# Patient Record
Sex: MALE | Race: WHITE | Employment: OTHER | URBAN - METROPOLITAN AREA
[De-identification: names, ages, dates, MRNs, and addresses within clinical notes are randomized per-mention and may not be internally consistent; named-entity substitution may affect disease eponyms.]

---

## 2019-12-19 ENCOUNTER — HOSPITAL ENCOUNTER (OUTPATIENT)
Dept: WOUND CARE | Age: 74
Discharge: HOME | End: 2019-12-19
Payer: MEDICARE

## 2019-12-19 ENCOUNTER — HOSPITAL ENCOUNTER (OUTPATIENT)
Dept: LAB | Age: 74
Discharge: HOME | End: 2019-12-19
Payer: MEDICARE

## 2019-12-19 ENCOUNTER — HOSPITAL ENCOUNTER (OUTPATIENT)
Dept: GENERAL RADIOLOGY | Age: 74
End: 2019-12-19
Attending: PODIATRIST
Payer: MEDICARE

## 2019-12-19 ENCOUNTER — HOSPITAL ENCOUNTER (OUTPATIENT)
Dept: GENERAL RADIOLOGY | Age: 74
Discharge: HOME | End: 2019-12-19
Payer: MEDICARE

## 2019-12-19 VITALS
OXYGEN SATURATION: 98 % | SYSTOLIC BLOOD PRESSURE: 130 MMHG | HEART RATE: 68 BPM | DIASTOLIC BLOOD PRESSURE: 76 MMHG | TEMPERATURE: 96.9 F

## 2019-12-19 DIAGNOSIS — L97.519 DIABETIC ULCER OF RIGHT FOOT (HCC): ICD-10-CM

## 2019-12-19 DIAGNOSIS — E11.621 DIABETIC ULCER OF RIGHT FOOT (HCC): ICD-10-CM

## 2019-12-19 PROBLEM — E11.42 DIABETIC POLYNEUROPATHY (HCC): Status: ACTIVE | Noted: 2019-12-19

## 2019-12-19 PROBLEM — L97.512 RIGHT FOOT ULCER, WITH FAT LAYER EXPOSED (HCC): Status: ACTIVE | Noted: 2019-12-19

## 2019-12-19 PROBLEM — L03.115 CELLULITIS OF RIGHT FOOT: Status: ACTIVE | Noted: 2019-12-19

## 2019-12-19 LAB
BASOPHILS # BLD: 0 K/UL (ref 0–0.1)
BASOPHILS NFR BLD: 0 % (ref 0–2)
DIFFERENTIAL METHOD BLD: ABNORMAL
EOSINOPHIL # BLD: 0.2 K/UL (ref 0–0.4)
EOSINOPHIL NFR BLD: 2 % (ref 0–5)
ERYTHROCYTE [DISTWIDTH] IN BLOOD BY AUTOMATED COUNT: 16 % (ref 11.6–14.5)
ERYTHROCYTE [SEDIMENTATION RATE] IN BLOOD: 71 MM/HR (ref 0–20)
HCT VFR BLD AUTO: 32 % (ref 36–48)
HGB BLD-MCNC: 9 G/DL (ref 13–16)
LYMPHOCYTES # BLD: 1.5 K/UL (ref 0.9–3.6)
LYMPHOCYTES NFR BLD: 16 % (ref 21–52)
MCH RBC QN AUTO: 24.3 PG (ref 24–34)
MCHC RBC AUTO-ENTMCNC: 28.1 G/DL (ref 31–37)
MCV RBC AUTO: 86.3 FL (ref 74–97)
MONOCYTES # BLD: 0.9 K/UL (ref 0.05–1.2)
MONOCYTES NFR BLD: 10 % (ref 3–10)
NEUTS SEG # BLD: 6.8 K/UL (ref 1.8–8)
NEUTS SEG NFR BLD: 72 % (ref 40–73)
PLATELET # BLD AUTO: 218 K/UL (ref 135–420)
PMV BLD AUTO: 9.9 FL (ref 9.2–11.8)
RBC # BLD AUTO: 3.71 M/UL (ref 4.7–5.5)
WBC # BLD AUTO: 9.4 K/UL (ref 4.6–13.2)

## 2019-12-19 PROCEDURE — 87070 CULTURE OTHR SPECIMN AEROBIC: CPT

## 2019-12-19 PROCEDURE — 85025 COMPLETE CBC W/AUTO DIFF WBC: CPT

## 2019-12-19 PROCEDURE — 73630 X-RAY EXAM OF FOOT: CPT

## 2019-12-19 PROCEDURE — 99213 OFFICE O/P EST LOW 20 MIN: CPT

## 2019-12-19 PROCEDURE — 97597 DBRDMT OPN WND 1ST 20 CM/<: CPT

## 2019-12-19 PROCEDURE — 36415 COLL VENOUS BLD VENIPUNCTURE: CPT

## 2019-12-19 PROCEDURE — 87077 CULTURE AEROBIC IDENTIFY: CPT

## 2019-12-19 PROCEDURE — 85652 RBC SED RATE AUTOMATED: CPT

## 2019-12-19 PROCEDURE — 87186 SC STD MICRODIL/AGAR DIL: CPT

## 2019-12-19 RX ORDER — MORPHINE SULFATE 15 MG/1
15 TABLET ORAL
COMMUNITY

## 2019-12-19 RX ORDER — BUDESONIDE AND FORMOTEROL FUMARATE DIHYDRATE 160; 4.5 UG/1; UG/1
2 AEROSOL RESPIRATORY (INHALATION) 2 TIMES DAILY
COMMUNITY

## 2019-12-19 RX ORDER — CARVEDILOL 25 MG/1
25 TABLET ORAL 2 TIMES DAILY WITH MEALS
Status: ON HOLD | COMMUNITY
End: 2020-06-30

## 2019-12-19 RX ORDER — ALLOPURINOL 300 MG/1
300 TABLET ORAL DAILY
COMMUNITY

## 2019-12-19 RX ORDER — FLUTICASONE PROPIONATE 50 MCG
2 SPRAY, SUSPENSION (ML) NASAL DAILY
COMMUNITY

## 2019-12-19 RX ORDER — OMEPRAZOLE 20 MG/1
20 CAPSULE, DELAYED RELEASE ORAL DAILY
Status: ON HOLD | COMMUNITY
End: 2020-06-30

## 2019-12-19 RX ORDER — POLYETHYLENE GLYCOL 3350 17 G/17G
17 POWDER, FOR SOLUTION ORAL DAILY
COMMUNITY

## 2019-12-19 RX ORDER — DOXYCYCLINE 100 MG/1
100 CAPSULE ORAL 2 TIMES DAILY
Qty: 14 CAP | Refills: 0 | Status: SHIPPED | OUTPATIENT
Start: 2019-12-19 | End: 2020-01-02 | Stop reason: SINTOL

## 2019-12-19 RX ORDER — TRAMADOL HYDROCHLORIDE 50 MG/1
50 TABLET ORAL
COMMUNITY

## 2019-12-19 RX ORDER — TORSEMIDE 20 MG/1
20 TABLET ORAL DAILY
COMMUNITY
End: 2020-10-05

## 2019-12-19 RX ORDER — INSULIN LISPRO 100 [IU]/ML
1-10 INJECTION, SOLUTION INTRAVENOUS; SUBCUTANEOUS AS NEEDED
COMMUNITY

## 2019-12-19 NOTE — DISCHARGE INSTRUCTIONS
Discharge Instructions for  [facility]  [address] [facility]  Telephone: [phone]    NAME:  Harry Shrestha OF BIRTH:  1945  MEDICAL RECORD NUMBER:  [id number]  EPISODE DATE:  12/19/2019        Mr. Manoj Webb, Dr Naveen Dawson recommends the following discharge instruction:    Offloading    [x]   Felt/Foam Offloading   [] Removable Cast Waker       []   Wedge Shoe   []  Wheelchair     []   Total Contact Cast   []  Surgical Shoe     []   Crutches        Other- Prevelon boot   Mattress:   Other:     Edema Control    []   Elevated legs as much as possible. Recommend above level of heart.     []   Layered Wraps             []   Left      []   Right      []  BILATERAL          - Type:            []   Unna Boot       []  Multi-Layer                                   []   Two Layers     []  Three Layers   []  Four Layers     []   Tubular Bandages        []   Left      []   Right     SIZE:      []   Stockings                      []   Left      []   Right     []   Compression Pump     []   Left      []   Right    ___ millimeters of mercury  ___ millimeters of mercury for ___ minutes for ___ day(s)        Other:       Nutritional Supplements    []  Multi-Vitamin       []  Other:       Select One     [x]  Home Health: family to do dressing change     []  Long Term Care:      []  DME:     Consult    []   Nutrition     []   Vascular     []   Orthotist/Pedorthotist     []   Infectious Disease     []   Lymphedema Therapist   Other:     Dressings:  Another brand of generically equivalent product may be dispensed unless specifically ordered otherwise.     Home Ulcer/wound Hygiene (cleanse with)      []   Distilled Water     []   Normal Saline     [x]   Wound Cleanser     []   Mild antimicrobial soap and water     []   Chlorhexidine liquid soap and water     []   Other:     Apply    []   Hydrogel   []  Hypergel   [x]   Aquacel Ag     []   Cadexomer Iodine (Iodosorb)   []  Silver Alginate    []   Medihoney:     []   Collagenase:Santyl   []  Calcium Alginate   []   Collagen:     []   Plain Foam   []  Non-Adherent Contact                Layer   []   Xeroform     []   Silver foam   []   Hydrocolloid   Adriana to wound base     []   Acticoat   []   Adaptic:      Other/Specific Instructions:    Cover With    []   Dry Gauze and Roll Gauze     []   Foam and Roll Gauze     []   Dry Gauze     [x]   Bordered gauze: heel protector bordered foam     []   Foam      []    Bordered         []   Nonbordered     []   Secure with Tape     []   Other       Mariola-Ulcer Care    []   Cream     []   Lotion     []   Ointment     []   Barrier     []   Other:     Change Dressing    []   Once Daily     [x]   Every Other Day     []   Every 3 Days       []   Every 5 Days     []   Every 7 Days     []   Do Not Change       []   2 x per week (Mon and Thurs. )     []   3 x per week (Mon, Wed, Fri. )     []   Other          Follow-Up:   [x]  Follow up  1 month                            []  As Needed (PRN)     []   Marco Ennis MD    []  Claire Casey DPM    [x]  Bruce Tee DPM   []   Mimi Vidal DPM      []   Nurse Visit            Please call the wound clinic ([phone]) regarding any questions or concerns. Electronically signed Eduardo Leigh RN, CWOCN on 12/19/2019 at 10:37 AM     Serina Smith 281: Should you experience any significant changes in your wound(s) or have questions about your wound care, please contact the [facility] Main at [address] [place] [address] 8:00 am - 4:30. If you need help with your wound outside these hours and cannot wait until we are again available, contact your PCP or go to the hospital emergency room. PLEASE NOTE: IF YOU ARE UNABLE TO OBTAIN WOUND SUPPLIES, CONTINUE TO USE THE SUPPLIES YOU HAVE AVAILABLE UNTIL YOU ARE ABLE TO REACH US. IT IS MOST IMPORTANT TO KEEP THE WOUND COVERED AT ALL TIMES.      Physician Signature:_______________________    Date: ___________ Time:  ____________

## 2019-12-19 NOTE — WOUND CARE
Wound/Ostomy Nurse Progress Note          Patient: Liu Alexander                                                                                      TBU:[birth year]    MRN: [id number]          Situation:new pt, Dr Renata Sanderson    Background:hx DM, neuropathy, COPD. Slow healing wound to plantar right foot below 5th toe    Recommendation: Dr Renata Sanderson ordered labs and xray. Pt to complete tests today at [facility]. 12/19/19 0854   Wound   Date First Assessed: 12/19/19     Dressing Status Removed   Dressing Type Adhesive wound dressing (Band-Aid)   Wound Length (cm) 1.3 cm   Wound Width (cm) 1.2 cm   Wound Depth (cm) 0.1 cm   Wound Surface Area (cm^2) 1.56 cm^2   Wound Volume (cm^3) 0.16 cm^3   Condition of Base Granulation;Pink   Condition of Edges Calloused   Tissue Type Percent Pink 100   Drainage Amount Scant   Drainage Color Serous   Wound Odor None   Mariola-wound Assessment Dry; Intact   Margins Attached edges   Cleansing and Cleansing Agents  Dermal wound cleanser   Dressing Changed Changed/New   Dressing Type Applied   (grey, aquacel ag, bordered mepilex)     Clinic Level of Care Assessment    NAME:  Liu Alexander  YOB: 1945 GENDER: male  MEDICAL RECORD NUMBER:  [id number]   DATE:  12/19/2019      Wound Count Document in [facility]  Number of Wounds Assessed Points   No Wounds/Ulcers []   0   Less than Three Wounds/Ulcers [x]   1   3-6 Wounds/Ulcers []   2   Greater than 6 Wounds/Ulcers []   3     Ambulation Status Document in Coord/FERNANDO/Mobility tab  Status Definition Points   Independent Independently able to ambulate. Fully able (without any assistance) to get on/off exam table/chair. [x]   0   Minimal Physical Assistance Requires physical assistance of one person to ambulate and/or position patient to be examined. Includes necessary physical assistance to position lower extremities on/off stool.  []   1   Moderate Physical Assistance Requires at least one staff member to physically assist patient in ambulating into treatment room, and on/off exam table. []   2   Full Assistance Requires assistance of at least two staff members to transfer patient into treatment room and/or on/off exam table/chair. \"Total Transfer\". []   3     Dressing Complexity Document in LDA and Write Appropriate Order  Complexity Definition Points   No Dressing  []   0   Simple Minimal, simple dressing. i.e. Band-aid, gauze, simple wrap. []   1   Intermediate Moderately complicated requiring licensed personnel to apply i.e. collagen matrix, ointments, gels, alginates. [x]   2   Complex Complicated requiring licensed personnel to apply dressings 6 or more wounds. []   3     Teaching Effort Document in Education Tab   Effort Definition Points   No Teaching  []   0   Simple Reinforce two or less topics. Document in Education navigator.  []   1   Intermediate Reinforce three to five topics and/or one additional   new topic. Document in Education navigator. [x]   2   Complex Teach more than one new topic. New patient information   packet reviewed and/or reinforce more than three topics. Document in Education navigator. HBO initial instruction. []   3       Patient Assessment and Planning  Planning Definition Points   Simple Multiple System Simple: Simple follow-up with routine assessment and planning. If Discharged, instructions and long term/follow-up care given to patient/caregiver. Discharged, instructions and/or After Visit Summary given to patient/caregiver and instructions completed. []   1   Intermediate Multiple System Intermediate: Contact with outside resources; i.e. Telephone calls to home health, [facility].  May include filling out forms and writing letters, arranging transportation, communication with insurance , vendors, etc.  Discharged, instructions and/or After Visit Summary given to patient/caregiver and instructions completed. [x]   2   Complex Multiple System Complex: Full, comprehensive assessment and planning. Follow the entire navigator under Wound Visit charting filling out each tab which includes OP Adm Database Screening, Education and CarePlan  HBO risk assessment completed. Discharged, instructions and/or After Visit Summary given to patient/caregiver and instructions completed.    []   3           Is this the Patient's First Visit to the [address] [place] [address] Road  Yes      Is this Patient Established @ [facility]  No             Clinical Level of Care      Points  0-2  Level 1 []     Points  3-5  Level 2 []     Points  6-9  Level 3 [x]     Points  10-12  Level 4 []     Points  13-15  Level 5 []       Electronically signed by Hay Johnson RN on 12/19/2019 at 9:45 AM

## 2019-12-19 NOTE — PROGRESS NOTES
Podiatry Consultation Note    Assessment:       Patient Active Problem List   Diagnosis Code    Right foot ulcer, with fat layer exposed ([facility] [address] L97.512    Diabetic polyneuropathy ([facility] [address] E11.42    Cellulitis of right foot L03.115       Plan:     Selective excisional debridement with continued LWC with Adriana and Aquacel AG Q 48 hrs  He is to use surgical shoe to offload plantar right foot. After saline flush C&S + gram stain was taken sub met 5 right foot. Will order right foot series, Sed rate, CBC c Diff. Will empirically place patient on eRx Doxycycline 100mg BID x 7 days pending results from C&S. RTC in 2 weeks    Subjective:     I was asked to evaluate [facility] for chronic diabetic ulcer right foot. Patient has recently moved to [place] from [place] where he was receiving ulcer care and diabetic care from a Podiatrist. Wife states the ulcer has been present since the late summer. He  is a 76 y.o. male admitted on 12/19/2019 for wound. Allergies   Allergen Reactions    Bee Sting [Sting, Bee] Anaphylaxis    Contrast Agent [Iodine] Other (comments)       Current Outpatient Medications   Medication Sig    insulin lispro protamine/insulin lispro (HUMALOG MIX 75-25,U-100,INSULN) 100 unit/mL (75-25) injection 30 Units by SubCUTAneous route Before breakfast and dinner.  insulin lispro (HUMALOG U-100 INSULIN) 100 unit/mL injection 1-10 Units by SubCUTAneous route as needed (sliding scale).  morphine IR (MS IR) 15 mg tablet Take 15 mg by mouth every four (4) hours as needed for Pain.  allopurinol (ZYLOPRIM) 300 mg tablet Take 300 mg by mouth daily.  torsemide (DEMADEX) 20 mg tablet Take 20 mg by mouth daily.  polyethylene glycol (MIRALAX) 17 gram/dose powder Take 17 g by mouth daily.  fluticasone propionate (FLONASE ALLERGY RELIEF) 50 mcg/actuation nasal spray 2 Sprays by Both Nostrils route daily.     budesonide-formoterol (SYMBICORT) 160-4.5 mcg/actuation HFAA Take 2 Puffs by inhalation two (2) times a day.  carvedilol (COREG) 25 mg tablet Take 25 mg by mouth two (2) times daily (with meals).  traMADol (ULTRAM) 50 mg tablet Take 50 mg by mouth every six (6) hours as needed for Pain.  omeprazole (PRILOSEC) 20 mg capsule Take 20 mg by mouth daily.  rivaroxaban (XARELTO) 10 mg tablet Take 10 mg by mouth daily.  doxycycline (VIBRAMYCIN) 100 mg capsule Take 1 Cap by mouth two (2) times a day. Indications: an infection of the skin and the tissue below the skin     No current facility-administered medications for this encounter. No past medical history on file. No past surgical history on file. No family history on file.   Social History     Socioeconomic History    Marital status:      Spouse name: Not on file    Number of children: Not on file    Years of education: Not on file    Highest education level: Not on file   Occupational History    Not on file   Social Needs    Financial resource strain: Not on file    Food insecurity:     Worry: Not on file     Inability: Not on file    Transportation needs:     Medical: Not on file     Non-medical: Not on file   Tobacco Use    Smoking status: Not on file   Substance and Sexual Activity    Alcohol use: Not on file    Drug use: Not on file    Sexual activity: Not on file   Lifestyle    Physical activity:     Days per week: Not on file     Minutes per session: Not on file    Stress: Not on file   Relationships    Social connections:     Talks on phone: Not on file     Gets together: Not on file     Attends [employer]ism service: Not on file     Active member of club or organization: Not on file     Attends meetings of clubs or organizations: Not on file     Relationship status: Not on file    Intimate partner violence:     Fear of current or ex partner: Not on file     Emotionally abused: Not on file     Physically abused: Not on file     Forced sexual activity: Not on file   Other Topics Concern    Not on file   Social History Narrative    Not on file       Physical Exam:  GENERAL: alert, cooperative, no distress, appears stated age    REVIEW OF SYSTEMS:  General: denies chronic fatigue, weight loss, fever, anemia, bruising, depression, nervousness, panic attacks  HEENT: denies ringing in ears, ear infections, dizzy spells, poor vision, glaucoma, sinus trouble, hoarseness, eye infections  GI: denies diarrhea, gas, bloating, heartburn, regurgitation, difficulty swallowing, painful swallowing, nausea, vomiting, constipation, abdominal pain, decreased appetite, blood in stools, black stools, jaundice, dark urine  Lungs: denies pneumonia, asthma, cough, SOB, hemoptysis  Heart: denies chest pain, irregular heart beat, ankle swelling,   Skin: denies rashes, hives, allergic reaction  Urinary: denies UTI, kidney stones, decreased urine force and flow, urination at night, blood in urine, painful urination  Bones and Joints: denies arthritis, rheumatism, back pain, gout, osteoporosis  Neurologic: denies stroke, seizures, headaches, numbness, tingling      There were no vitals filed for this visit. OBJECTIVE:    Patient is well nourished, cooperative, pleasant and in no apparent distress. Lower Extremity Exam:     VASCULAR EXAM:. Pedal pulses are palpable 1/4 DP 1/4 PT right and left foot. Pulses heard with doppler are biphasic PT and monophasic DP Skin temperature is warm to warm right and left foot. Digital capillary fill time is 4 seconds right and left foot. There is mild edema of the right foot and ankle. NEUROLOGICAL EXAM:. Sensation Decrease with 5.07g monofilament wire right and left foot. Deep tendon reflexes intact and symmetrical on the right and left foot. Babinski is age appropriate, bilateral    MUSCULOSKELETAL EXAM:. Muscle tone is normal.  Muscle strength of the flexor and extensor group inversion and eversion Bilateral. 5/5.      MYCOTIC NAIL Dystrophic nail 1,2,3,4,5 bilateral. Elongated thickened nails 1,2,3,4,5 bilateral Hypertrophic nails 1,2,3,4,5    DERMATOLOGICAL EXAM:. Skin is of abnormal texture and turgor with atrophic skin changes noting hair growth, nail changes (thickening), Bilateral.There is noted erythema and edema over the lateral aspect of the 5th MPJ and distal shaft 5th metatarsal.  There is a ulcer full thickness sub met 5 right with serous exudate and mild odor noted upon debridement      No results found for this or any previous visit (from the past 24 hour(s)). Imaging: ordered today        EXCISIONAL DEBRIDEMENT NOTE    Selective sharp instrument debridement of slough and devitilized tissue    After the benefits/risks/SE were discussed, the patient agreed to proceed. Time out was done:   * Patient was identified by name and date of birth   * Agreement on procedure being performed was verified   * Procedure site verified and marked as necessary   * Patient was positioned for comfort   * Consent was signed and verified. Site: sub met 5 right    Instruments used:    []  Dermal curette  [x] Blade        [] #15  [x] #10  [] Forceps  [] Tissue nippers  [] sterile scissors  [] Other     Anesthesia:    []  EMLA 2.5% cream: applied to wound beds for approximately 15minutes. []   Lidocaine 2% Topical Gel      []  Lidocaine injectable 1% with epinephrine 1:100,000    []  Lidocaine injectable 1% without epinephrine    []  Other:     []  None         [] patient is insensate due to neuropathy         [] patient declines        [] allergy to anesthetic        [] tissue for debridement is either superficial, loosely adherent and/or necrotic and denervated     After satisfactory anesthesia achieved, the wound/s was/were sharply excisional debrided necrotic, devitalized and granulation tissue down to the sub Q layer, revealing a clean and viable wound bed. Post debridement measurement was 1.5__x_1.5_x_0.1_cm . Bleeding: <5mL Resolved with light focal pressure.      Wounds were cleaned and irrigated with saline. Wound care applied:   []   Hydrogell   []  Hypergel   [x]   Hydrofiber/Aquacel  AG    []   Cadexomer Iodine (Iodosorb)   []  Silver Alginate    []   Medihoney:    []   Collagenase:Santyl   []  Calcium Alginate   []   Collagen:    []   Foam   []  Non-Adherent Contact Layer   []   Xeroform    []   Adaptec:   []   Hydrocolloid   []   Transparent Film    []   Promogran   [x]   Adriana   []   Promogran       []  Antibiotic ointment/cream  []   Wound VAC   []   Other (see below)     Other:     []   Dry Gauze and Roll Gauze    []   Foam and Roll Gauze    []   Dry Gauze    []    Bordered gauze:     []   Secure with Tape    [x]   Other mepilex and surgical shoe to offload sub met 5 right     [x]   Compression Wrap:          []   Unna Boot    []Multi-Layer    [x]Tubular Bandages       Mariola-Ulcer Care    []   Cream     []   Lotion     []   Ointment     []   Barrier     []   Other:       The patient tolerated the procedure well with no complications. The patient left the exam room in satisfactory and stable condition.        Ronny Garcia DPM  [facility]  [phone]  12/19/2019, 9:35 AM

## 2019-12-22 LAB
BACTERIA SPEC CULT: ABNORMAL
GRAM STN SPEC: ABNORMAL
GRAM STN SPEC: ABNORMAL
SERVICE CMNT-IMP: ABNORMAL

## 2020-01-02 ENCOUNTER — HOSPITAL ENCOUNTER (OUTPATIENT)
Dept: WOUND CARE | Age: 75
Discharge: HOME | End: 2020-01-02
Payer: MEDICARE

## 2020-01-02 VITALS
OXYGEN SATURATION: 94 % | HEART RATE: 67 BPM | SYSTOLIC BLOOD PRESSURE: 127 MMHG | DIASTOLIC BLOOD PRESSURE: 59 MMHG | TEMPERATURE: 96.3 F

## 2020-01-02 PROCEDURE — 99213 OFFICE O/P EST LOW 20 MIN: CPT

## 2020-01-02 NOTE — DISCHARGE INSTRUCTIONS
Discharge Instructions for  [address]  Telephone: [phone]    NAME:  Ismael Ocasio  YOB: 1945  MEDICAL RECORD NUMBER:  [id number]  EPISODE DATE:  1/2/2020        Laura JhaveriZhannamilly Askew, Dr. Rogelio Ward recommends the following discharge instruction:    Offloading    [x]   Felt/Foam Offloading   [] Removable Cast Luwanna Spatz       []   Wedge Shoe   []  Wheelchair     []   Total Contact Cast   []  Surgical Shoe     []   Crutches        Other   Mattress:   Other:     Edema Control    []   Elevated legs as much as possible. Recommend above level of heart.     []   Layered Wraps             []   Left      []   Right      []  BILATERAL          - Type:            []   Unna Boot       []  Multi-Layer                                   []   Two Layers     []  Three Layers   []  Four Layers     []   Tubular Bandages        []   Left      []   Right     SIZE:      []   Stockings                      []   Left      []   Right     []   Compression Pump     []   Left      []   Right    ___ millimeters of mercury  ___ millimeters of mercury for ___ minutes for ___ day(s)        Other:       Nutritional Supplements    []  Multi-Vitamin       []  Other:       Select One     []  Home Health:      []  Long Term Care:      []  DME:     Consult    []   Nutrition     []   Vascular     []   Orthotist/Pedorthotist     []   Infectious Disease     []   Lymphedema Therapist   Other:     Dressings:  Another brand of generically equivalent product may be dispensed unless specifically ordered otherwise.     Home Ulcer/wound Hygiene (cleanse with)      []   Distilled Water     []   Normal Saline     []   Wound Cleanser     []   Mild antimicrobial soap and water     []   Chlorhexidine liquid soap and water     []   Other:     Apply    []   Hydrogel   []  Hypergel   []   Aquacel Ag     []   Cadexomer Iodine                     (Iodosorb)   []  Silver Alginate []   Medihoney:     []   Collagenase:Santyl   []  Calcium Alginate   []   Collagen:     []   Plain Foam   []  Non-Adherent Contact                Layer   []   Xeroform     []   Silver foam   []   Hydrocolloid        []   Acticoat   []   Adaptic:      Other/Specific Instructions: Mirragen bio glass was applied today. Dr. Jm Dan will do a second application on 2/9/03. You DO NOT need to change this dressing. Cover With    []   Dry Gauze and Roll Gauze     []   Foam and Roll Gauze     []   Dry Gauze     []   Bordered gauze:     []   Foam      []    Bordered         []   Nonbordered     []   Secure with Tape     [x]   Other COBAN (may be applied over top of dressing to protect it)      Mariola-Ulcer Care    []   Cream     []   Lotion     []   Ointment     []   Barrier     []   Other:     Change Dressing    []   Once Daily     []   Every Other Day     []   Every 3 Days       []   Every 5 Days     []   Every 7 Days     []   Do Not Change       []   2 x per week (Mon and Thurs. )     []   3 x per week (Mon, Wed, Fri. )     []   Other          Follow-Up:   [x]  Follow up  1/9/20 and 1/16/20                            []  As Needed (PRN)     []   Luis Garcia MD    []  Kenneth Berkowitz DPM    [x]  Travis Ariza DPM   []   Ramona Ocampo DPM      []   Nurse Visit            Please call the wound clinic ([phone]) regarding any questions or concerns. Electronically signed Kaya Haas RN on 1/2/2020 at 11:41 AM     [address] [place] [address] Information: Should you experience any significant changes in your wound(s) or have questions about your wound care, please contact the [facility] Main at [address] [place] [address] 8:00 am - 4:30. If you need help with your wound outside these hours and cannot wait until we are again available, contact your PCP or go to the hospital emergency room.      PLEASE NOTE: IF YOU ARE UNABLE TO OBTAIN WOUND SUPPLIES, CONTINUE TO USE THE SUPPLIES YOU HAVE AVAILABLE UNTIL YOU ARE ABLE TO REACH US. IT IS MOST IMPORTANT TO KEEP THE WOUND COVERED AT ALL TIMES.      Physician Signature:_______________________    Date: ___________ Time:  ____________

## 2020-01-02 NOTE — PROGRESS NOTES
MIRRAGEN® Advanced Wound Matrix   and Debridement Procedure Note    Pre-Operative Diagnosis: Non-Healing Wound  Post-Operative Diagnosis: Same    Site: sub met 5 right     Procedure:   1. Selective sharp instrument debridement of slough and devitilized tissue  2. Application of MIRRAGEN® Advanced Wound Matrix    Indications: 1. Removal of devitalized and necrotic tissue to promote healing and evaluate the extent of healing. 2. Stage 1 of a planned staged series of    10         applications of MIRRAGEN® Advanced Wound Matrix in wound not responding to conventional therapy     After the benefits/risks/SE were discussed, the patient agreed to proceed. Time out was done:   * Patient was identified by name and date of birth   * Agreement on procedure being performed was verified   * Procedure site verified and marked as necessary   * Patient was positioned for comfort   * Consent was signed and verified. Instruments used:    []  Dermal curette  Blade        [] #15  [] #10  [x] Forceps  [] Tissue nippers  [] sterile scissors  [] Other     Anesthesia used:    []  EMLA 2.5% cream: applied to wound beds for approximately 15minutes. []   Lidocaine 2% Topical Gel      []  Lidocaine injectable 1% with epinephrine 1:100,000; Amount used: mL    []  Lidocaine injectable 1% without epinephrine; Amount used: mL    []  Other:     [x]  None         [] patient is insensate due to neuropathy         [] patient declines        [] allergy to anesthetic        [] tissue for debridement is either superficial, loosely adherent and/or necrotic and denervated     Description of Procedure: After usual preparation,     Pre-debridement measurement: see accompanying progress note  Post debridement measurement:  1.0_x_1.1_x_0.1_cm . Bleeding: <5mL Resolved with light focal pressure. Wounds were cleaned and irrigated with saline.      After usual preparation, Mirragen applied to the wound and affixed to the skin with steri strips and covered with non-adherent contact layer. The patient tolerated the procedure without complication. MIRRAGEN Advanced Wound Matrix 5 sq cm sheet  MIRRAGEN Advanced Wound Matrix used: 5 sq cm  MIRRAGEN Advanced Wound Matrix discarded: 0 sq cm    Wound care applied:   []   Hydrogell   []  Hypergel   [x]   Hydrofiber/Aquacel  AG    []   Cadexomer Iodine (Iodosorb)   []  Silver Alginate    []   Medihoney:    []   Collagenase:Santyl   []  Calcium Alginate   []   Collagen:    [x]   Foam offloading foam   []  Non-Adherent Contact Layer   []   Xeroform    []   Adaptec:   []   Hydrocolloid   []   Transparent Film    []  Antibiotic ointment/cream    []  hyderofera blue   []   Other (see below)    [] Mesalt       Other:     []   Dry Gauze and Roll Gauze    []   Foam and Roll Gauze    []   Dry Gauze    []    Bordered gauze:     []   Secure with Tape    [x]   Other  coban    []   Compression Wrap:          []   Unna Boot    []Multi-Layer    []Tubular Bandages       Mariola-Ulcer Care    []   Cream     []   Lotion     []   Ointment     []   Barrier     []   Other:     Sed rate from 12/19/19 = 71      EXAM: RIGHT FOOT RADIOGRAPHS     CLINICAL INDICATION/HISTORY: diabetic ulcer right  -Additional: None     COMPARISON: None     TECHNIQUE: 3 views of the right foot     _______________     FINDINGS:     BONES: Osseous alignment is as expected on the provided nonweightbearing  projections. Severe right first MTP joint osteoarthritis is present along with  advanced degenerative changes across the tarsometatarsal articulations, greatest  involving the third through fifth TMT joints with prominent subchondral cyst  formation present with in the anterior aspect of the cuboid at the level of the  fifth TMT joint. No evidence of fracture.     SOFT TISSUES: Relatively diffuse soft tissue swelling. No retained radiopaque  foreign object.  Heterotopic ossification inferior to the medial malleolus in  keeping with prior sprain.     _______________     IMPRESSION  IMPRESSION:        1. Degenerative changes as described as well as diffuse soft tissue swelling  without evidence of fracture or retained radiopaque foreign object. Imaging     The patient tolerated the procedure well with no complications. Will continue to monitor the possible need for MRI with the slightly elevated Sed Rate with no elevation in wbc nor clinical signs of infection. Don't change dressing. RTC in 1 week for second application   The patient left the exam room in satisfactory and stable condition.      Arthmoses Melissa DPM

## 2020-01-02 NOTE — WOUND CARE
01/02/20 0847   Wound   Date First Assessed: 12/19/19     Dressing Status Removed   Dressing Type Adhesive wound dressing (Band-Aid)   Wound Length (cm) 1 cm   Wound Width (cm) 1.1 cm   Wound Surface Area (cm^2) 1.1 cm^2   Condition of Base Pink   Tissue Type Percent Pink 100   Drainage Amount None   Wound Odor None   Mariola-wound Assessment Other (Comment)  (Calloused)   Cleansing and Cleansing Agents  Dermal wound cleanser   Dressing Changed Changed/New   Dressing Type Applied Other (Comment)  (Mirragen, Aquacel Ag, Off-loading foam)     Clinic Level of Care Assessment    NAME:  Shiv Roy  YOB: 1945 GENDER: male  MEDICAL RECORD NUMBER:  [id number]   DATE:  1/2/2020      Wound Count Document in [address] [place] [address]  Number of Wounds Assessed Points   No Wounds/Ulcers []   0   Less than Three Wounds/Ulcers [x]   1   3-6 Wounds/Ulcers []   2   Greater than 6 Wounds/Ulcers []   3     Ambulation Status Document in Coord/FERNANDO/Mobility tab  Status Definition Points   Independent Independently able to ambulate. Fully able (without any assistance) to get on/off exam table/chair. [x]   0   Minimal Physical Assistance Requires physical assistance of one person to ambulate and/or position patient to be examined. Includes necessary physical assistance to position lower extremities on/off stool. []   1   Moderate Physical Assistance Requires at least one staff member to physically assist patient in ambulating into treatment room, and on/off exam table. []   2   Full Assistance Requires assistance of at least two staff members to transfer patient into treatment room and/or on/off exam table/chair. \"Total Transfer\". []   3     Dressing Complexity Document in LDA and Write Appropriate Order  Complexity Definition Points   No Dressing  []   0   Simple Minimal, simple dressing. i.e. Band-aid, gauze, simple wrap.  []   1   Intermediate Moderately complicated requiring licensed personnel to apply i.e. collagen matrix, ointments, gels, alginates. [x]   2   Complex Complicated requiring licensed personnel to apply dressings 6 or more wounds. []   3     Teaching Effort Document in Education Tab   Effort Definition Points   No Teaching  []   0   Simple Reinforce two or less topics. Document in Education navigator. [x]   1   Intermediate Reinforce three to five topics and/or one additional   new topic. Document in Education navigator. []   2   Complex Teach more than one new topic. New patient information   packet reviewed and/or reinforce more than three topics. Document in Education navigator. HBO initial instruction. []   3       Patient Assessment and Planning  Planning Definition Points   Simple Multiple System Simple: Simple follow-up with routine assessment and planning. If Discharged, instructions and long term/follow-up care given to patient/caregiver. Discharged, instructions and/or After Visit Summary given to patient/caregiver and instructions completed. [x]   1   Intermediate Multiple System Intermediate: Contact with outside resources; i.e. Telephone calls to home health, [facility]. May include filling out forms and writing letters, arranging transportation, communication with insurance , vendors, etc.  Discharged, instructions and/or After Visit Summary given to patient/caregiver and instructions completed. []   2   Complex Multiple System Complex: Full, comprehensive assessment and planning. Follow the entire navigator under Wound Visit charting filling out each tab which includes OP Adm Database Screening, Education and CarePlan  HBO risk assessment completed. Discharged, instructions and/or After Visit Summary given to patient/caregiver and instructions completed.    []   3           Is this the Patient's First Visit to the [address]  No      Is this Patient Established @ [facility]  Yes             Clinical Level of Care      Points  0-2  Level 1 []     Points  3-5  Level 2 [x] Points  6-9  Level 3 []     Points  10-12  Level 4 []     Points  13-15  Level 5 []       Electronically signed by Gladys Giraldo RN on 1/2/2020 at 9:21 AM

## 2020-01-09 ENCOUNTER — HOSPITAL ENCOUNTER (OUTPATIENT)
Dept: WOUND CARE | Age: 75
Discharge: HOME | End: 2020-01-09
Payer: MEDICARE

## 2020-01-09 VITALS
DIASTOLIC BLOOD PRESSURE: 70 MMHG | HEART RATE: 75 BPM | SYSTOLIC BLOOD PRESSURE: 152 MMHG | TEMPERATURE: 97 F | OXYGEN SATURATION: 99 %

## 2020-01-09 RX ORDER — AMOXICILLIN AND CLAVULANATE POTASSIUM 500; 125 MG/1; MG/1
1 TABLET, FILM COATED ORAL 2 TIMES DAILY
Qty: 10 TAB | Refills: 0 | Status: SHIPPED | OUTPATIENT
Start: 2020-01-09 | End: 2020-03-05

## 2020-01-09 NOTE — PROGRESS NOTES
MIRRAGEN® Advanced Wound Matrix and Debridement Procedure Note    Pre-Operative Diagnosis: Non-Healing Wound  Post-Operative Diagnosis: Same    Site: sub met 5 right     Procedure:   1. Selective sharp instrument debridement of slough and devitilized tissue  2. Application of MIRRAGEN® Advanced Wound Matrix    Indications: 1. Removal of devitalized and necrotic tissue to promote healing and evaluate the extent of healing. 2. Stage 2 of a planned staged series of      10                    applications of MIRRAGEN® Advanced Wound Matrix in wound not responding to conventional therapy     After the benefits/risks/SE were discussed, the patient agreed to proceed. Time out was done:   * Patient was identified by name and date of birth   * Agreement on procedure being performed was verified   * Procedure site verified and marked as necessary   * Patient was positioned for comfort   * Consent was signed and verified. Instruments used:    []  Dermal curette  Blade        [] #15  [] #10  [] Forceps  [] Tissue nippers  [] sterile scissors  [] Other     Anesthesia used:    []  EMLA 2.5% cream: applied to wound beds for approximately 15minutes. []   Lidocaine 2% Topical Gel      []  Lidocaine injectable 1% with epinephrine 1:100,000; Amount used: mL    []  Lidocaine injectable 1% without epinephrine; Amount used: mL    []  Other:     []  None         [] patient is insensate due to neuropathy         [] patient declines        [] allergy to anesthetic        [] tissue for debridement is either superficial, loosely adherent and/or necrotic and denervated     Description of Procedure: After usual preparation, An selective excisional debridement with #10 blade and hemostasis was obtained with light pressure  Pre-debridement measurement: see accompanying progress note  Post debridement measurement:  2.0_x_1.1_x_0.1_cm . Bleeding: <5mL Resolved with light focal pressure.      Wounds were cleaned and irrigated with saline. After usual preparationMIRRAGEN® Advanced Wound Matrix, applied to the wound and affixed to the skin with steri strips and covered with non-adherent contact layer. The patient tolerated the procedure without complication. MIRRAGEN 6.5 sq cm sheet  MIRRAGEN used: 4.0 sq cm  MIRRAGEN discarded:2.5 sq cm    Wound care applied:   []   Hydrogell   []  Hypergel   []   Hydrofiber/Aquacel      []   Cadexomer Iodine (Iodosorb)   []  Silver Alginate    []   Medihoney:    []   Collagenase:Santyl   []  Calcium Alginate   []   Collagen:    []   Foam offloading foam   []  Non-Adherent Contact Layer   []   Xeroform    []   Adaptec:   []   Hydrocolloid   []   Transparent Film    []  Antibiotic ointment/cream    []  hyderofera blue   []   Other (see below)    [] Mesalt       Other:cutimed sorback     []   Dry Gauze and Roll Gauze    []   Foam and Roll Gauze    []   Dry Gauze    []    Bordered gauze:     []   Secure with Tape    [x]   Other  mepilex    []   Compression Wrap:          []   Unna Boot    []Multi-Layer    []Tubular Bandages       Mariola-Ulcer Care    []   Cream     []   Lotion     []   Ointment     []   Barrier     []   Other:    eRx Augmentin 500mg BID x 5 days    The patient tolerated the procedure well with no complications. The patient left the exam room in satisfactory and stable condition.      Ciara Melissa DPM

## 2020-01-16 ENCOUNTER — HOSPITAL ENCOUNTER (OUTPATIENT)
Dept: WOUND CARE | Age: 75
Discharge: HOME | End: 2020-01-16
Payer: MEDICARE

## 2020-01-16 VITALS
HEART RATE: 61 BPM | SYSTOLIC BLOOD PRESSURE: 127 MMHG | OXYGEN SATURATION: 97 % | DIASTOLIC BLOOD PRESSURE: 57 MMHG | TEMPERATURE: 98.1 F

## 2020-01-16 DIAGNOSIS — L03.115 CELLULITIS OF RIGHT FOOT: ICD-10-CM

## 2020-01-16 DIAGNOSIS — E11.42 DIABETIC POLYNEUROPATHY ASSOCIATED WITH TYPE 2 DIABETES MELLITUS (HCC): ICD-10-CM

## 2020-01-16 DIAGNOSIS — L97.512 RIGHT FOOT ULCER, WITH FAT LAYER EXPOSED (HCC): Primary | ICD-10-CM

## 2020-01-16 RX ORDER — LIDOCAINE AND PRILOCAINE 25; 25 MG/G; MG/G
CREAM TOPICAL
Status: COMPLETED | OUTPATIENT
Start: 2020-01-16 | End: 2020-01-16

## 2020-01-16 RX ADMIN — LIDOCAINE AND PRILOCAINE: 25; 25 CREAM TOPICAL at 09:24

## 2020-01-16 NOTE — PROGRESS NOTES
MIRRAGEN® Advanced Wound Matrix and Debridement Procedure Note    Pre-Operative Diagnosis: Non-Healing Wound  Post-Operative Diagnosis: Same    Site: sub met 5 right     Procedure:   1. Selective sharp instrument debridement of slough and devitilized tissue  2. Application of MIRRAGEN® Advanced Wound Matrix      Indications: 1. Removal of devitalized and necrotic tissue to promote healing and evaluate the extent of healing. 2. Stage 3 of a planned staged series of    10                      applications of  MIRRAGEN® Advanced Wound Matrix in wound not responding to conventional therapy     After the benefits/risks/SE were discussed, the patient agreed to proceed. Time out was done:   * Patient was identified by name and date of birth   * Agreement on procedure being performed was verified   * Procedure site verified and marked as necessary   * Patient was positioned for comfort   * Consent was signed and verified. Instruments used:    []  Dermal curette  Blade        [] #15  [x] #10  [] Forceps  [] Tissue nippers  [] sterile scissors  [] Other     Anesthesia used:    [x]  EMLA 2.5% cream: applied to wound beds for approximately 15minutes. []   Lidocaine 2% Topical Gel      []  Lidocaine injectable 1% with epinephrine 1:100,000; Amount used: mL    []  Lidocaine injectable 1% without epinephrine; Amount used: mL    []  Other:     []  None         [] patient is insensate due to neuropathy         [] patient declines        [] allergy to anesthetic        [] tissue for debridement is either superficial, loosely adherent and/or necrotic and denervated     Description of Procedure: After usual preparation, sharp debridement of slough and devitalized tissue was performed utilizing the instruments as above. Tissue was removed from the sub layer. The wound was debrided to remove non-viable tissue and to clearly reveal the wound margins.  The wound was debrided to an acute state with some bleeding from the capillary bed. Pre-debridement measurement: see accompanying progress note  Post debridement measurement:  1.5_x_1.2_x0.1_cm . Bleeding: <5mL Resolved with light focal pressure. Wounds were cleaned and irrigated with saline. After usual preparation, MIRRAGEN® Advanced Wound Matrix  applied to the wound and affixed to the skin with steri strips and covered with non-adherent contact layer. The patient tolerated the procedure without complication. MIRRAGEN 6.5 sq cm sheet  MIRRAGEN used: 4.0 sq cm  MIRRAGEN discarded:2.5 sq cm    Wound care applied:   []   Hydrogell   []  Hypergel   []   Hydrofiber/Aquacel      []   Cadexomer Iodine (Iodosorb)   []  Silver Alginate    []   Medihoney:    []   Collagenase:Santyl   []  Calcium Alginate   []   Collagen:    [x]   Foam offloading   []  Non-Adherent Contact Layer   []   Xeroform    []   Adaptec:   []   Hydrocolloid   []   Transparent Film    []  Antibiotic ointment/cream    []  hyderofera blue   []   Other (see below)    [] Mesalt       Other:cutimed sorbact     []   Dry Gauze and Roll Gauze    []   Foam and Roll Gauze    []   Dry Gauze    []    Bordered gauze:     []   Secure with Tape    [x]   Other mepilex     []   Compression Wrap:          []   Unna Boot    []Multi-Layer    []Tubular Bandages       Mariola-Ulcer Care    []   Cream     []   Lotion     []   Ointment     []   Barrier     []   Other:   Continue surgical shoe to also assist with offloading. Advised patient to take MVI c A,C and Zinc   Will order MRI right forefoot r/o osteomyelitis 5th metatarsal    The patient tolerated the procedure well with no complications. The patient left the exam room in satisfactory and stable condition.      Jeancarlos Franklin DPM

## 2020-01-16 NOTE — WOUND CARE
Patient Name: Carolee Tinoco    : 1945    MRN: [id number]             20 0839   Wound   Date First Assessed: 19     Dressing Status Removed   Dressing Type Foam;Gauze;Gauze wrap (karma); Other (Comment)  (Aquacel AG, Merregen to wound base)   Wound Length (cm) 1.5 cm   Wound Width (cm) 1.2 cm   Wound Depth (cm) 0.2 cm   Wound Surface Area (cm^2) 1.8 cm^2   Wound Volume (cm^3) 0.36 cm^3   Condition of Base [place]   Assessment Drainage;Pink   Tissue Type Percent Pink 100   Undermining (cm) 0.2 cm   Direction of Undermining 7 o'clock;11 o'clock   Drainage Amount Small   Drainage Color Serous   Mariola-wound Assessment Other (Comment)  (calloused)   Cleansing and Cleansing Agents  Dermal wound cleanser   Dressing Changed Changed/New   Wound Procedure Type Callus Reduction

## 2020-01-21 ENCOUNTER — HOSPITAL ENCOUNTER (OUTPATIENT)
Dept: WOUND CARE | Age: 75
Discharge: HOME | End: 2020-01-21

## 2020-01-21 NOTE — WOUND CARE
Clinic Level of Care Assessment    NAME:  Chiara Diaz  YOB: 1945 GENDER: male  MEDICAL RECORD NUMBER:  [id number]   DATE:  1/21/2020      Wound Count Document in [address] [place] [address]  Number of Wounds Assessed Points   No Wounds/Ulcers []   0   Less than Three Wounds/Ulcers [x]   1   3-6 Wounds/Ulcers []   2   Greater than 6 Wounds/Ulcers []   3     Ambulation Status Document in Coord/FERNANDO/Mobility tab  Status Definition Points   Independent Independently able to ambulate. Fully able (without any assistance) to get on/off exam table/chair. [x]   0   Minimal Physical Assistance Requires physical assistance of one person to ambulate and/or position patient to be examined. Includes necessary physical assistance to position lower extremities on/off stool. []   1   Moderate Physical Assistance Requires at least one staff member to physically assist patient in ambulating into treatment room, and on/off exam table. []   2   Full Assistance Requires assistance of at least two staff members to transfer patient into treatment room and/or on/off exam table/chair. \"Total Transfer\". []   3     Dressing Complexity Document in LDA and Write Appropriate Order  Complexity Definition Points   No Dressing  []   0   Simple Minimal, simple dressing. i.e. Band-aid, gauze, simple wrap. []   1   Intermediate Moderately complicated requiring licensed personnel to apply i.e. collagen matrix, ointments, gels, alginates. [x]   2   Complex Complicated requiring licensed personnel to apply dressings 6 or more wounds. []   3     Teaching Effort Document in Education Tab   Effort Definition Points   No Teaching  []   0   Simple Reinforce two or less topics. Document in Education navigator. [x]   1   Intermediate Reinforce three to five topics and/or one additional   new topic. Document in Education navigator. []   2   Complex Teach more than one new topic.  New patient information   packet reviewed and/or reinforce more than three topics. Document in Education navigator. HBO initial instruction. []   3       Patient Assessment and Planning  Planning Definition Points   Simple Multiple System Simple: Simple follow-up with routine assessment and planning. If Discharged, instructions and long term/follow-up care given to patient/caregiver. Discharged, instructions and/or After Visit Summary given to patient/caregiver and instructions completed. [x]   1   Intermediate Multiple System Intermediate: Contact with outside resources; i.e. Telephone calls to home health, [facility]. May include filling out forms and writing letters, arranging transportation, communication with insurance , vendors, etc.  Discharged, instructions and/or After Visit Summary given to patient/caregiver and instructions completed. []   2   Complex Multiple System Complex: Full, comprehensive assessment and planning. Follow the entire navigator under Wound Visit charting filling out each tab which includes OP Adm Database Screening, Education and CarePlan  HBO risk assessment completed. Discharged, instructions and/or After Visit Summary given to patient/caregiver and instructions completed.    []   3           Is this the Patient's First Visit to the [address] [place] [address] Road  No      Is this Patient Established @ [facility]  Yes             Clinical Level of Care      Points  0-2  Level 1 []     Points  3-5  Level 2 [x]     Points  6-9  Level 3 []     Points  10-12  Level 4 []     Points  13-15  Level 5 []       Electronically signed by Karo Mark RN on 1/21/2020 at 2:20 PM

## 2020-01-22 ENCOUNTER — HOSPITAL ENCOUNTER (OUTPATIENT)
Dept: MRI IMAGING | Age: 75
Discharge: HOME | End: 2020-01-22
Attending: PODIATRIST
Payer: MEDICARE

## 2020-01-22 DIAGNOSIS — E11.42 DIABETIC POLYNEUROPATHY ASSOCIATED WITH TYPE 2 DIABETES MELLITUS (HCC): ICD-10-CM

## 2020-01-22 DIAGNOSIS — L97.512 RIGHT FOOT ULCER, WITH FAT LAYER EXPOSED (HCC): ICD-10-CM

## 2020-01-22 DIAGNOSIS — L03.115 CELLULITIS OF RIGHT FOOT: ICD-10-CM

## 2020-01-22 PROCEDURE — 73718 MRI LOWER EXTREMITY W/O DYE: CPT

## 2020-01-22 NOTE — PROGRESS NOTES
MIRRAGEN® Advanced Wound Matrix and Debridement Procedure Note    Pre-Operative Diagnosis: Non-Healing Wound  Post-Operative Diagnosis: Same    Site: sub met 5 right foot     Procedure:   1. Selective sharp instrument debridement of slough and devitilized tissue  2. Application of MIRRAGEN® Advanced Wound Matrix    Indications: 1. Removal of devitalized and necrotic tissue to promote healing and evaluate the extent of healing. 2. Stage 4 of a planned staged series of   10        applications of MIRRAGEN® Advanced Wound Matrix in wound not responding to conventional therapy     After the benefits/risks/SE were discussed, the patient agreed to proceed. Time out was done:   * Patient was identified by name and date of birth   * Agreement on procedure being performed was verified   * Procedure site verified and marked as necessary   * Patient was positioned for comfort   * Consent was signed and verified. Instruments used:    []  Dermal curette  Blade        [] #15  [] #10  [] Forceps  [] Tissue nippers  [] sterile scissors  [] Other     Anesthesia used:    []  EMLA 2.5% cream: applied to wound beds for approximately 15minutes. []   Lidocaine 2% Topical Gel      []  Lidocaine injectable 1% with epinephrine 1:100,000; Amount used: mL    []  Lidocaine injectable 1% without epinephrine; Amount used: mL    []  Other:     []  None         [] patient is insensate due to neuropathy         [] patient declines        [] allergy to anesthetic        [] tissue for debridement is either superficial, loosely adherent and/or necrotic and denervated     Description of Procedure: After usual preparation,Pre-debridement measurement: see accompanying progress note   . Bleeding: <5mL Resolved with light focal pressure. Wounds were cleaned and irrigated with saline.      After usual preparation, MIRRAGEN® Advanced Wound Matrixapplied to the wound and affixed to the skin with steri strips and covered with non-adherent contact layer. The patient tolerated the procedure without complication. MIRRAGEN® Advanced Wound Matrix 6.5 sq cm sheet  MIRRAGEN® Advanced Wound Matrix used: 3.5 sq cm  MIRRAGEN® Advanced Wound Matrix discarded: 3.0 sq cm    Wound care applied:   []   Hydrogell   []  Hypergel   []   Hydrofiber/Aquacel      []   Cadexomer Iodine (Iodosorb)   []  Silver Alginate    []   Medihoney:    []   Collagenase:Santyl   []  Calcium Alginate   []   Collagen:    [x]   Foam offloading   []  Non-Adherent Contact Layer   []   Xeroform    []   Adaptec:   []   Hydrocolloid   []   Transparent Film    []  Antibiotic ointment/cream    []  hyderofera blue   []   Other (see below)    [] Mesalt       Other:cutimed sorbact     []   Dry Gauze and Roll Gauze    []   Foam and Roll Gauze    []   Dry Gauze    []    Bordered gauze:     []   Secure with Tape    [x]   Other  mepilex and surgical shoe    []   Compression Wrap:          []   Unna Boot    []Multi-Layer    []Tubular Bandages       Mariola-Ulcer Care    []   Cream     []   Lotion     []   Ointment     []   Barrier     []   Other:       The patient tolerated the procedure well with no complications. The patient left the exam room in satisfactory and stable condition.      Taylor Robb DPM

## 2020-01-30 ENCOUNTER — HOSPITAL ENCOUNTER (OUTPATIENT)
Dept: WOUND CARE | Age: 75
Discharge: HOME | End: 2020-01-30
Payer: MEDICARE

## 2020-01-30 PROBLEM — L85.1 ACQUIRED KERATODERMA: Status: ACTIVE | Noted: 2020-01-30

## 2020-01-30 PROBLEM — B07.0 PLANTAR WARTS: Status: ACTIVE | Noted: 2020-01-30

## 2020-01-30 PROCEDURE — 97597 DBRDMT OPN WND 1ST 20 CM/<: CPT

## 2020-01-30 RX ORDER — IMIQUIMOD 12.5 MG/.25G
1.1 CREAM TOPICAL
Qty: 12 PACKET | Refills: 1 | Status: SHIPPED | OUTPATIENT
Start: 2020-01-31

## 2020-01-30 NOTE — WOUND CARE
Wound/Ostomy Nurse Progress Note          Patient: Jesse Mullins                                                                                      NUO:5/94/7100    MRN: [id number]                 01/30/20 1054   Wound Foot Right;Lateral   Date First Assessed: 12/19/19   Primary Wound Type: Diabetic Ulcer  Location: Foot  Wound Location Orientation: Right;Lateral   Dressing Status Removed   Dressing Type Other (Comment)  (merrigen, cutimed, Meplex, plain offloading foam)   Non-staged Wound Description Full thickness   Wound Length (cm) 1 cm   Wound Width (cm) 1 cm   Wound Depth (cm) 0.2 cm   Wound Surface Area (cm^2) 1 cm^2   Wound Volume (cm^3) 0.2 cm^3   Condition of Base Pink   Condition of Edges Calloused   Assessment Drainage;Pink   Tissue Type Percent Pink 100   Drainage Amount Small   Drainage Color Serous   Wound Odor None   Mariola-wound Assessment Maceration   Cleansing and Cleansing Agents  Dermal wound cleanser; Soap and water   Dressing Changed Changed/New   Dressing Type Applied Other (Comment)  (Merregen, Cutimed, steri strips, bordered foam, plain foam)   Wound Heel Plantar;Left   Date First Assessed/Time First Assessed: 01/30/20 1054   Primary Wound Type: Diabetic Ulcer  Location: Heel  Wound Location Orientation: Plantar;Left   Dressing Type Open to air   Shape circular   Wound Length (cm) 0.3 cm   Wound Width (cm) 0.3 cm   Wound Surface Area (cm^2) 0.09 cm^2   Assessment Dry; Intact;Painful   Drainage Amount None   Wound Odor None   Mariola-wound Assessment Clean;Dry; Intact   Cleansing and Cleansing Agents  Dermal wound cleanser   Dressing Changed Changed/New   Dressing Type Applied Foam  (meplex bordered foam, plain foam for offloading)   Wound Procedure Type Selective Debridement       Plan: pt to return Thursday w/ Dr Sarita Virgen 2/6/2020 @ 12:30 for additional debridemernt of L heel wound.

## 2020-01-30 NOTE — PROGRESS NOTES
MIRRAGEN® Advanced Wound Matrix  and Debridement Procedure Note    Pre-Operative Diagnosis: Non-Healing Wound  Post-Operative Diagnosis: Same    Site: sub met 5 right     Procedure:   1. Selective sharp instrument debridement of slough and devitilized tissue  2. Application of MIRRAGEN® Advanced Wound Matrix     Indications: 1. Removal of devitalized and necrotic tissue to promote healing and evaluate the extent of healing. 2. Stage 5 of a planned staged series of  10                  applications ofMIRRAGEN® Advanced Wound Matrix   in wound not responding to conventional therapy     After the benefits/risks/SE were discussed, the patient agreed to proceed. Time out was done:   * Patient was identified by name and date of birth   * Agreement on procedure being performed was verified   * Procedure site verified and marked as necessary   * Patient was positioned for comfort   * Consent was signed and verified. Instruments used:    []  Dermal curette  Blade        [] #15  [] #10  [] Forceps  [] Tissue nippers  [] sterile scissors  [] Other     Anesthesia used:    []  EMLA 2.5% cream: applied to wound beds for approximately 15minutes. []   Lidocaine 2% Topical Gel      []  Lidocaine injectable 1% with epinephrine 1:100,000; Amount used: mL    []  Lidocaine injectable 1% without epinephrine; Amount used: mL    []  Other:     []  None         [] patient is insensate due to neuropathy         [] patient declines        [] allergy to anesthetic        [] tissue for debridement is either superficial, loosely adherent and/or necrotic and denervated     Description of Procedure: After usual preparation,   Pre-debridement measurement: see accompanying progress note  Post debridement measurement:  1.0_x1.0_x0.2_cm . Bleeding: <5mL Resolved with light focal pressure. Wounds were cleaned and irrigated with saline.      After usual preparationMIRRAGEN® Advanced Wound Matrix ,  applied to the wound and affixed to the skin with steri strips and covered with non-adherent contact layer. The patient tolerated the procedure without complication. MIRRAGEN® Advanced Wound Matrix  6.5 sq cm sheet  MIRRAGEN® Advanced Wound Matrix  used: 2.5 sq cm  MIRRAGEN® Advanced Wound Matrix  discarded: 4.0 sq cm    Wound care applied:   []   Hydrogell   []  Hypergel   []   Hydrofiber/Aquacel      []   Cadexomer Iodine (Iodosorb)   []  Silver Alginate    []   Medihoney:    []   Collagenase:Santyl   []  Calcium Alginate   []   Collagen:    [x]   Foam offloading   []  Non-Adherent Contact Layer   []   Xeroform    []   Adaptec:   []   Hydrocolloid   []   Transparent Film    []  Antibiotic ointment/cream    []  hyderofera blue   []   Other (see below)    [] Mesalt       Other:cutimed sorbact     []   Dry Gauze and Roll Gauze    []   Foam and Roll Gauze    []   Dry Gauze    []    Bordered gauze:     []   Secure with Tape    [x]   Other  mepilex    []   Compression Wrap:          []   Unna Boot    []Multi-Layer    []Tubular Bandages     Continue surgical shoe to offload and take MVI w A,C and Zinc  Mariola-Ulcer Care    []   Cream     []   Lotion     []   Ointment     []   Barrier     []   Other:     Hyperkeratotic lesion noted medial plantar left heel, No pinpoint bleeding noted upon debridement. eRx Aldara cream 0.5% apply Monday, Wednesday and Friday PM and wash in the AM    The patient tolerated the procedure well with no complications. The patient left the exam room in satisfactory and stable condition.      Kiki Kaplan DPM

## 2020-01-30 NOTE — DISCHARGE INSTRUCTIONS
Discharge Instructions for  [address]  Telephone: [phone]    NAME:  Dominick Coe  YOB: 1945  MEDICAL RECORD NUMBER:  [id number]  EPISODE DATE:  1/30/2020        Mr. Murrell Janel, Dr Ce Banerjee recommends the following discharge instruction:    Krzysztof Dills    []   Felt/Foam Offloading   [] Removable Cast Waker       []   Wedge Shoe   []  Wheelchair     []   Total Contact Cast   [x]  Surgical Shoe     []   Crutches        Other   Mattress:   Other:     Edema Control    [x]   Elevated legs as much as possible. Recommend above level of heart.     []   Layered Wraps             []   Left      []   Right      []  BILATERAL          - Type:            []   Unna Boot       []  Multi-Layer                                   []   Two Layers     []  Three Layers   []  Four Layers     []   Tubular Bandages        []   Left      []   Right     SIZE:      []   Stockings                      []   Left      []   Right     []   Compression Pump     []   Left      []   Right    ___ millimeters of mercury  ___ millimeters of mercury for ___ minutes for ___ day(s)        Other:       Nutritional Supplements    []  Multi-Vitamin       []  Other:       Select One     []  Home Health:      []  Long Term Care:      []  DME:     Consult    []   Nutrition     []   Vascular     []   Orthotist/Pedorthotist     []   Infectious Disease     []   Lymphedema Therapist   Other:     Dressings:  Another brand of generically equivalent product may be dispensed unless specifically ordered otherwise.     Home Ulcer/wound Hygiene (cleanse with)      []   Distilled Water     [x]   Normal Saline     []   Wound Cleanser     [x]   Mild antimicrobial soap and water     []   Chlorhexidine liquid soap and water     []   Other:     Apply    []   Hydrogel   []  Hypergel   []   Aquacel Ag     []   Cadexomer Iodine                     (Iodosorb)   []  Silver Alginate    [] Medihoney:     []   Collagenase:Santyl   []  Calcium Alginate   []   Collagen:     []   Plain Foam   []  Non-Adherent Contact                Layer   []   Xeroform     []   Silver foam   []   Hydrocolloid   Merrigen to wound bed, cover w/ Cutimed     []   Acticoat   []   Adaptic:      Other/Specific Instructions:    Cover With    []   Dry Gauze and Roll Gauze     []   Foam and Roll Gauze     []   Dry Gauze     []   Bordered gauze:     [x]   Foam      [x]    Bordered         []   Nonbordered     []   Secure with Tape     []   Other  Plain foam to offload, bordered foam to cover [address]    []   Cream     []   Lotion     []   Ointment     []   Barrier     []   Other:     Change Dressing    []   Once Daily     []   Every Other Day     []   Every 3 Days       []   Every 5 Days     [x]   Every 7 Days     []   Do Not Change       []   2 x per week (Mon and Thurs. )     []   3 x per week (Mon, Wed, Fri. )     []   Other          Follow-Up:   [x]  Follow up                              []  As Needed (PRN)     []   Miracle Sousa MD    []  Gino Ball DPM    [x]  Candie MCMULLENM   []   Benoit Tellez DPM      []   Nurse Visit            Please call the wound clinic ([phone]) regarding any questions or concerns. Electronically signed Rhianna Jeffrey RN, CWON on 1/30/2020 at 12:50 PM     [address] Information: Should you experience any significant changes in your wound(s) or have questions about your wound care, please contact the [address] [place] [address] at [address] [place] [address] 8:00 am - 4:30. If you need help with your wound outside these hours and cannot wait until we are again available, contact your PCP or go to the hospital emergency room. PLEASE NOTE: IF YOU ARE UNABLE TO OBTAIN WOUND SUPPLIES, CONTINUE TO USE THE SUPPLIES YOU HAVE AVAILABLE UNTIL YOU ARE ABLE TO REACH US. IT IS MOST IMPORTANT TO KEEP THE WOUND COVERED AT ALL TIMES.      Physician Signature:_______________________    Date: ___________ Time:  ____________

## 2020-02-06 ENCOUNTER — HOSPITAL ENCOUNTER (OUTPATIENT)
Dept: WOUND CARE | Age: 75
Discharge: HOME | End: 2020-02-06
Payer: MEDICARE

## 2020-02-06 PROCEDURE — 87186 SC STD MICRODIL/AGAR DIL: CPT

## 2020-02-06 PROCEDURE — 97597 DBRDMT OPN WND 1ST 20 CM/<: CPT

## 2020-02-06 PROCEDURE — 87070 CULTURE OTHR SPECIMN AEROBIC: CPT

## 2020-02-06 PROCEDURE — 87077 CULTURE AEROBIC IDENTIFY: CPT

## 2020-02-06 NOTE — WOUND CARE
Wound/Ostomy Nurse Progress Note          Patient: Erickson Pitt                                                                                      MKR:9/39/5598    MRN: [id number]             02/06/20 1303   Wound Foot Right;Lateral   Date First Assessed: 12/19/19   Primary Wound Type: Diabetic Ulcer  Location: Foot  Wound Location Orientation: Right;Lateral   Dressing Status Removed   Dressing Type Foam;Other (Comment)  (Merrigen, Cutimed, bordered foam, plain foam offloading)   Non-staged Wound Description Full thickness   Wound Length (cm) 1 cm   Wound Width (cm) 1 cm   Wound Depth (cm) 0.2 cm   Wound Surface Area (cm^2) 1 cm^2   Wound Volume (cm^3) 0.2 cm^3   Assessment Drainage;Pink   Tissue Type Percent Pink 100   Drainage Amount Moderate   Drainage Color Serosanguinous   Wound Odor Musty   Cleansing and Cleansing Agents  Dermal wound cleanser; Soap and water   Dressing Changed Changed/New   Dressing Type Applied Foam;Post-op shoe;Special tape (comment); Other (Comment)  (Isabel Flores, bordered foam, plain foam-offloading)   Wound Heel Plantar;Left   Date First Assessed/Time First Assessed: 01/30/20 1054   Primary Wound Type: Diabetic Ulcer  Location: Heel  Wound Location Orientation: Plantar;Left   Dressing Type Open to air   Non-staged Wound Description Full thickness   Wound Length (cm) 0.3 cm   Wound Width (cm) 0.3 cm   Wound Surface Area (cm^2) 0.09 cm^2   Dressing Type Applied Foam   Wound Procedure Type Other  (splinter removal)        Plan: Wife to change outer dressing to \"green mesh\" Cutimed PRN if dressing becomes soiled or wet. Pt to return in two weeks for follow up with Dr Chance Trimble.

## 2020-02-06 NOTE — PROGRESS NOTES
MIRRAGEN® Advanced Wound Matrix and Debridement Procedure Note    Pre-Operative Diagnosis: Non-Healing Wound  Post-Operative Diagnosis: Same    Site: sub met 5 right     Procedure:   1. Selective sharp instrument debridement of slough and devitilized tissue  2. Application of MIRRAGEN® Advanced Wound Matrix     Indications: 1. Removal of devitalized and necrotic tissue to promote healing and evaluate the extent of healing. 2. Stage 6 of a planned staged series of    10                      applications ofMIRRAGEN® Advanced Wound Matrix   in wound not responding to conventional therapy     After the benefits/risks/SE were discussed, the patient agreed to proceed. Time out was done:   * Patient was identified by name and date of birth   * Agreement on procedure being performed was verified   * Procedure site verified and marked as necessary   * Patient was positioned for comfort   * Consent was signed and verified. Instruments used:    []  Dermal curette  Blade        [] #15  [x] #10  [] Forceps  [] Tissue nippers  [] sterile scissors  [] Other     Anesthesia used:    []  EMLA 2.5% cream: applied to wound beds for approximately 15minutes. []   Lidocaine 2% Topical Gel      []  Lidocaine injectable 1% with epinephrine 1:100,000; Amount used: mL    []  Lidocaine injectable 1% without epinephrine; Amount used: mL    []  Other:     [x]  None         [x] patient is insensate due to neuropathy         [] patient declines        [] allergy to anesthetic        [] tissue for debridement is either superficial, loosely adherent and/or necrotic and denervated     Description of Procedure: After usual preparation, sharp debridement of slough and devitalized tissue was performed utilizing the instruments as above. Tissue was removed from the sub Q layer. The wound was debrided to remove non-viable tissue and to clearly reveal the wound margins.  The wound was debrided to an acute state with some bleeding from the capillary bed. Pre-debridement measurement: see accompanying progress note  Post debridement measurement:  _1.0_x1.0__x_0.1_cm . Bleeding: <5mL Resolved with light focal pressure. Wounds were cleaned and irrigated with saline. After usual preparation, MIRRAGEN® Advanced Wound Matrix applied to the wound and affixed to the skin with steri strips and covered with non-adherent contact layer. The patient tolerated the procedure without complication. MIRRAGEN® Advanced Wound Matrix 6.5 sq cm sheet  MIRRAGEN® Advanced Wound Matrix  used: 4.0 sq cm  MIRRAGEN® Advanced Wound Matrix  discarded: 2.5 sq cm    Wound care applied:   []   Hydrogell   []  Hypergel   []   Hydrofiber/Aquacel      []   Cadexomer Iodine (Iodosorb)   []  Silver Alginate    []   Medihoney:    []   Collagenase:Santyl   []  Calcium Alginate   []   Collagen:    [x]   Foam offloading plain   []  Non-Adherent Contact Layer   []   Xeroform    []   Adaptec:   []   Hydrocolloid   []   Transparent Film    []  Antibiotic ointment/cream    []  hyderofera blue   []   Other (see below)    [] Mesalt       Other:cutimed sorbact     []   Dry Gauze and Roll Gauze    []   Foam and Roll Gauze    []   Dry Gauze    []    Bordered gauze:     []   Secure with Tape    [x]   Other mepilex     []   Compression Wrap:          []   Unna Boot    []Multi-Layer    []Tubular Bandages       Mariola-Ulcer Care    []   Cream     []   Lotion     []   Ointment     []   Barrier     []   Other:     Continue surgical shoe and MVI wA,C and zinc. Patient has been given John protein packets to take BID. The patient tolerated the procedure well with no complications. The patient left the exam room in satisfactory and stable condition.      Rogelio Finnegan DPM

## 2020-02-06 NOTE — DISCHARGE INSTRUCTIONS
Discharge Instructions for  [address]  [place] [facility] [address]  Telephone: [phone]    NAME:  Danny Almeida  YOB: 1945  MEDICAL RECORD NUMBER:  [id number]  EPISODE DATE:  2/6/2020        Mr. Sunni Mao, Dr Enoc Chiang recommends the following discharge instruction:    [place]    []   Felt/Foam Offloading   [] Removable Cast Waker       []   Wedge Shoe   []  Wheelchair     []   Total Contact Cast   []  Surgical Shoe     []   Crutches        Other   Mattress:   Other:     Edema Control    [x]   Elevated legs as much as possible. Recommend above level of heart.     []   Layered Wraps             []   Left      []   Right      []  BILATERAL          - Type:            []   Unna Boot       []  Multi-Layer                                   []   Two Layers     []  Three Layers   []  Four Layers     []   Tubular Bandages        []   Left      []   Right     SIZE:      []   Stockings                      []   Left      []   Right     []   Compression Pump     []   Left      []   Right    ___ millimeters of mercury  ___ millimeters of mercury for ___ minutes for ___ day(s)        Other:       Nutritional Supplements    []  Multi-Vitamin       [x]  Other: protein drinks      Select One     []  Home Health:      []  Long Term Care:      []  DME:     Consult    []   Nutrition     []   Vascular     []   Orthotist/Pedorthotist     []   Infectious Disease     []   Lymphedema Therapist   Other:     Dressings:  Another brand of generically equivalent product may be dispensed unless specifically ordered otherwise.     Home Ulcer/wound Hygiene (cleanse with)      []   Distilled Water     []   Normal Saline     [x]   Wound Cleanser     [x]   Mild antimicrobial soap and water     []   Chlorhexidine liquid soap and water     []   Other:     Apply    []   Hydrogel   []  Hypergel   []   Aquacel Ag     []   Cadexomer Iodine                     (Iodosorb)   []  Silver Alginate    []   Medihoney:     []   Collagenase:Santyl   []  Calcium Alginate   []   Collagen:     []   Plain Foam   []  Non-Adherent Contact                Layer   []   Xeroform     []   Silver foam   []   Hydrocolloid        []   Acticoat   []   Adaptic:      Other/Specific Instructions:Wound selectively debrided and Merrigen applied to wound bed by Dr Tereso Sapp. Cutimed applied over [address] and secured by Steri strips. Meplex bordered foam applied as secondary dressing and plain foam used to offload wound. Cover With    []   Dry Gauze and Roll Gauze     []   Foam and Roll Gauze     []   Dry Gauze     []   Bordered gauze:     [x]   Foam      [x]    Bordered         [x]   Nonbordered     [x]   Secure with Tape-Hypafix     []   Other       Mariola-Ulcer Care    []   Cream     []   Lotion     []   Ointment     []   Barrier     []   Other:     Change Dressing    []   Once Daily     []   Every Other Day     []   Every 3 Days       []   Every 5 Days     []   Every 7 Days     []   Do Not Change       []   2 x per week (Mon and Thurs. )     []   3 x per week (Mon, Wed, Fri. )     []   Other          Follow-Up:   [x]  Follow up in two weeks                             []  As Needed (PRN)     []   Lyndsay Jaquez MD    []  Cailin Posada DPM    [x]  Yasmeen Rocha DPM   []   Gracia Calvert DPM      []   Nurse Visit            Please call the wound clinic ([phone]) regarding any questions or concerns. Electronically signed Paris Beckett RN, CWON on 2/6/2020 at 2:50 PM     Serina Smith 281: Should you experience any significant changes in your wound(s) or have questions about your wound care, please contact the [address] [place] [address] at [address] [place] [address] 8:00 am - 4:30. If you need help with your wound outside these hours and cannot wait until we are again available, contact your PCP or go to the hospital emergency room.      PLEASE NOTE: IF YOU ARE UNABLE TO OBTAIN WOUND SUPPLIES, CONTINUE TO USE THE SUPPLIES YOU HAVE AVAILABLE UNTIL YOU ARE ABLE TO REACH US. IT IS MOST IMPORTANT TO KEEP THE WOUND COVERED AT ALL TIMES.      Physician Signature:_______________________    Date: ___________ Time:  ____________

## 2020-02-09 LAB
BACTERIA SPEC CULT: ABNORMAL
BACTERIA SPEC CULT: ABNORMAL
GRAM STN SPEC: ABNORMAL
GRAM STN SPEC: ABNORMAL
SERVICE CMNT-IMP: ABNORMAL

## 2020-02-10 RX ORDER — SULFAMETHOXAZOLE AND TRIMETHOPRIM 800; 160 MG/1; MG/1
1 TABLET ORAL 2 TIMES DAILY
Qty: 20 TAB | Refills: 0 | Status: SHIPPED | OUTPATIENT
Start: 2020-02-10 | End: 2020-03-05

## 2020-02-10 NOTE — PROGRESS NOTES
Telephoned patient today at 14:10 at [phone] to inform patient the cx came back positive for MRSA eRx Septra DS BID x 10 days was sent to his pharmacy.  Patient states will  from pharmacy

## 2020-02-20 ENCOUNTER — HOSPITAL ENCOUNTER (OUTPATIENT)
Dept: WOUND CARE | Age: 75
Discharge: HOME | End: 2020-02-20
Payer: MEDICARE

## 2020-02-20 VITALS — TEMPERATURE: 96.9 F | HEART RATE: 103 BPM | OXYGEN SATURATION: 97 %

## 2020-02-20 PROBLEM — B07.0 PLANTAR WARTS: Status: RESOLVED | Noted: 2020-01-30 | Resolved: 2020-02-20

## 2020-02-20 PROCEDURE — 97597 DBRDMT OPN WND 1ST 20 CM/<: CPT

## 2020-02-20 RX ORDER — METHYLPREDNISOLONE 4 MG/1
TABLET ORAL
COMMUNITY
End: 2020-10-05

## 2020-02-20 NOTE — PROGRESS NOTES
Progress and Debridement Note    Patient: Michelle Lopez MRN: [id number]  SSN: [id number]    YOB: 1945  Age: 76 y.o. Sex: male      Assessment:     Active Problems:    Right foot ulcer, with fat layer exposed ([facility] [address] (12/19/2019)      Diabetic polyneuropathy ([facility] [address] (12/19/2019)        Plan:      Selective excisional debridement of soft tissue with change in [facility] to Adriana , offloading foam, mepliex Q 48 hrs, and Rx CAM boot left foot,  Will order ABIc TBI studies. I believe patient will benefit from wide excision of ulcer with resection of the 5th metatarsal head left foot. Will start the process to schedule surgical intervention left foot. Hold john protein drink since there is increasing blood sugar levels. Complete Bactrim as prescribed. Subjective:     75 y/o diabetic with chronic ulcer, presents with supportive wife s/p Mirragin application x 6. He states the protein drink John has increased his blood sugar levels. He is still taking the Bactrim DS as instructed.      Objective:     Patient Vitals for the past 24 hrs:   Temp Pulse SpO2   02/20/20 0838 96.9 °F (36.1 °C) (!) 103 97 %       Physical Exam:     General Exam  alert, cooperative, no distress, appears stated age    REVIEW OF SYSTEMS:  General: denies chronic fatigue, weight loss, fever, anemia, bruising, depression, nervousness, panic attacks  HEENT: denies ringing in ears, ear infections, dizzy spells, poor vision, glaucoma, sinus trouble, hoarseness, eye infections  GI: denies diarrhea, gas, bloating, heartburn, regurgitation, difficulty swallowing, painful swallowing, nausea, vomiting, constipation, abdominal pain, decreased appetite, blood in stools, black stools, jaundice, dark urine  Lungs: denies pneumonia, asthma, cough, SOB, hemoptysis  Heart: denies chest pain, irregular heart beat, ankle swelling,   Skin: denies rashes, hives, allergic reaction  Urinary: denies UTI, kidney stones, decreased urine force and flow, urination at night, blood in urine, painful urination  Bones and Joints: denies arthritis, rheumatism, back pain, gout, osteoporosis  Neurologic: denies stroke, seizures, headaches, numbness, tingling    VASCULAR EXAM:. Pedal pulses are palpable 1/4 DP 1/4 PT right and left foot. Pulses heard with doppler are biphasic PT and monophasic DP Skin temperature is warm to warm right and left foot. Digital capillary fill time is 4 seconds right and left foot. There is mild edema of the right foot and ankle.      NEUROLOGICAL EXAM:. Sensation Decrease with 5.07g monofilament wire right and left foot. Deep tendon reflexes intact and symmetrical on the right and left foot.  Babinski is age appropriate, bilateral     MUSCULOSKELETAL EXAM:. Muscle tone is normal.  Muscle strength of the flexor and extensor group inversion and eversion Bilateral. 5/5.      MYCOTIC NAIL Dystrophic nail 1,2,3,4,5 bilateral. Elongated thickened nails 1,2,3,4,5 bilateral Hypertrophic nails 1,2,3,4,5     DERMATOLOGICAL EXAM:. Skin is of abnormal texture and turgor with atrophic skin changes noting hair growth, nail changes (thickening), Bilateral.There is noted erythema and edema over the lateral aspect of the 5th MPJ and distal shaft 5th metatarsal.  There is a ulcer full thickness sub met 5 right with measurements as stated below, and serous exudate and mild odor still noted upon debridement       Wound Foot Right;Lateral (Active)   Dressing Status Removed 2/6/2020  1:03 PM   Dressing Type Foam;Other (Comment) 2/6/2020  1:03 PM   Non-staged Wound Description Full thickness 2/6/2020  1:03 PM   Wound Length (cm) 1 cm 2/6/2020  1:03 PM   Wound Width (cm) 1 cm 2/6/2020  1:03 PM   Wound Depth (cm) 0.2 cm 2/6/2020  1:03 PM   Wound Surface Area (cm^2) 1 cm^2 2/6/2020  1:03 PM   Wound Volume (cm^3) 0.2 cm^3 2/6/2020  1:03 PM   Condition of Base [place] 1/30/2020 10:54 AM   Condition of Edges [place] 1/30/2020 10:54 AM   Assessment Drainage;[place] 2/6/2020 1:03 PM   Tissue Type Percent Pink 100 2/6/2020  1:03 PM   Undermining (cm) 0.2 cm 1/16/2020  8:39 AM   Direction of Undermining 7 o'clock;11 o'clock 1/16/2020  8:39 AM   Drainage Amount Moderate 2/6/2020  1:03 PM   Drainage Color Serosanguinous 2/6/2020  1:03 PM   Wound Odor Musty 2/6/2020  1:03 PM   Mariola-wound Assessment Maceration 1/30/2020 10:54 AM   Margins Attached edges 12/19/2019  8:54 AM   Cleansing and Cleansing Agents  Dermal wound cleanser; Soap and water 2/6/2020  1:03 PM   Dressing Changed Changed/New 2/6/2020  1:03 PM   Dressing Type Applied Foam;Post-op shoe;Special tape (comment); Other (Comment) 2/6/2020  1:03 PM   Wound Procedure Type Callus Reduction 1/16/2020  8:39 AM   Procedure Time Out 0910 1/16/2020  9:30 AM   Consent Obtained  Yes 1/16/2020  9:30 AM   Number of days: 63       Wound Heel Plantar;Left (Active)   Dressing Status Removed 2/20/2020  8:41 AM   Dressing Type Other (Comment); Adhesive wound dressing (Band-Aid) 2/20/2020  8:41 AM   Non-staged Wound Description Full thickness 2/20/2020  8:41 AM   Shape circular 1/30/2020 10:54 AM   Wound Length (cm) 0.9 cm 2/20/2020  8:41 AM   Wound Width (cm) 0.8 cm 2/20/2020  8:41 AM   Wound Surface Area (cm^2) 0.72 cm^2 2/20/2020  8:41 AM   Drainage Amount None 2/20/2020  8:41 AM   Wound Odor None 2/20/2020  8:41 AM   Dressing Type Applied Foam 2/6/2020  1:03 PM   Wound Procedure Type Other 2/6/2020  1:03 PM   Number of days: 21          Lab/Data Review:  No results found for this or any previous visit (from the past 24 hour(s)). COMPARISON: Right foot radiographs dated 12/19/2019.     FINDINGS:     The MR compatible marker is placed along the lateral aspect of the forefoot. In addition, there is induration of the subcutaneous tissues as well as mild  skin thickening noted. There is no evidence of an organized or drainable fluid  collection present.  Similarly, there is no evidence of adjacent T1 marrow signal  hypointensity to suggest osteomyelitis.     There is no evidence of fracture, stress fracture, or marrow stress reaction  throughout the imaged forefoot or midfoot.     There is moderate severity chondrosis present across the tarsometatarsal  articulations, with greatest involvement of the second through fourth TMT  articulations. The fifth TMT joint is not included within the imaged field of  view. Subchondral cystic change and marrow edema across the involved  tarsometatarsal joints is noted.     There is moderately severe right first MTP joint chondrosis and osteoarthritis  with small joint effusion/synovitis present. Remaining MTP joints appear within  normal limits. IP joint of the great toe as well as the IP joints of the lesser  toes are similarly appear within normal limits as visualized.     Flexor and extensor tendons appear intact.     There is diffusely abnormal intrinsic muscle bulk and signal in keeping with  findings of subacute denervation.     IMPRESSION  IMPRESSION:     1. Lateral forefoot soft tissue ulcer without evidence of osteomyelitis or  organized/drainable soft tissue fluid collection.     2. Degenerative changes to the midfoot and forefoot as described.     3. No evidence of fracture, stress fracture, or marrow stress reaction.     4. Diffusely abnormal intrinsic muscle bulk and signal, typical for changes  related to subacute denervation      PROCEDURE    Selective sharp instrument excisional debridement of slough and devitilized tissue    After the benefits/risks/SE were discussed, the patient agreed to proceed. Time out was done:   * Patient was identified by name and date of birth   * Agreement on procedure being performed was verified   * Procedure site verified and marked as necessary   * Patient was positioned for comfort   * Consent was signed and verified.      Site: sub met 5 left    Instruments used:    []  Dermal curette  [x] Blade        [x] #15  [] #10  [] Forceps  [] Tissue nippers  [] sterile scissors  [] Other     Anesthesia:    []  EMLA 2.5% cream: applied to wound beds for approximately 15minutes. []   Lidocaine 2% Topical Gel      []  Lidocaine injectable 1% with epinephrine 1:100,000    []  Lidocaine injectable 1% without epinephrine    []  Other:     [x]  None         [x] patient is insensate due to neuropathy         [] patient declines        [] allergy to anesthetic        [] tissue for debridement is either superficial, loosely adherent and/or necrotic and denervated     After satisfactory anesthesia achieved, the wound/s was/were sharply debrided necrotic, devitalized and granulation tissue down to the sub Q layer, revealing a clean and viable wound bed. Post debridement measurement was 1.1_x_1.0_x_0.1_cm . Bleeding: <5mL Resolved with light focal pressure. Wounds were cleaned and irrigated with saline. Wound care applied:   []   Hydrogell   []  Hypergel   []   Hydrofiber/Aquacel      []   Cadexomer Iodine (Iodosorb)   []  Silver Alginate    []   Medihoney:    []   Collagenase:Santyl   []  Calcium Alginate   [x]   Collagen:Adriana    [x]   Foamoffloading foam   []  Non-Adherent Contact Layer   []   Xeroform    []   Adaptec:   []   Hydrocolloid   []   Transparent Film    []  Antibiotic ointment/cream     []   Other (see below)     Other:     []   Dry Gauze and Roll Gauze    []   Foam and Roll Gauze    []   Dry Gauze    []   Bordered gauze:     []   Secure with Tape    [x]   Bordered foam       [x]   Other  CAM boot placed today    []   Compression Wrap:          []   Unna Boot    []Multi-Layer    []Tubular Bandages       Mariola-Ulcer Care    []   Cream     []   Lotion     []   Ointment     []   Barrier     []   Other:       The patient tolerated the procedure well with no complications. The patient left the exam room in satisfactory and stable condition.      Signed By: Hawk Mcdonald DPM     February 20, 2020 9:09 AM

## 2020-02-20 NOTE — WOUND CARE
Wound/Ostomy Nurse Progress Note          Patient: Ge Cao                                                                                      [id number]    MRN: [id number]        Situation: Wound consult    Background: Slow healing DFU right foot. Assessment: See flow sheet. Recommendation: No follow up. Surgery scheduled. 20 0841   Wound Heel Plantar;Left   Date First Assessed/Time First Assessed: 20 1054   Primary Wound Type: Diabetic Ulcer  Location: Heel  Wound Location Orientation: Plantar;Left   Dressing Status Removed   Dressing Type Other (Comment); Adhesive wound dressing (Band-Aid)  (Cutimed sorbact)   Non-staged Wound Description Full thickness   Wound Length (cm) 0.9 cm   Wound Width (cm) 0.8 cm   Wound Depth (cm) 0.2 cm   Wound Surface Area (cm^2) 0.72 cm^2   Wound Volume (cm^3) 0.14 cm^3   Assessment Other (Comment)   Tissue Type Percent Pink 30   Tissue Type Percent Red 70   Drainage Amount None   Wound Odor None   Dressing Type Applied Other (Comment)  (Collagen, off loading foam, Mepilex)   Wound Procedure Type Selective Debridement   Procedure Time Out 0900   Consent Obtained  Yes   Procedure Bleeding Minimal   Procedure Hemostasis  Pressure   Type of Tissue Removed  Devitalized   Procedure Instrument  Blade   Procedure Pain Scale Numeric 0/10   Debridement Procedure Performed by Beny Briseno DPM   Post-Procedure Length (cm) 0.9 cm   Post-Procedure Width (cm) 0.9 cm   Post-Procedure Depth (cm) 0.1 cm   Post-Procedure Volume (cm^3) 0.08 cm^3   Post-Procedure Surface Area (cm^2) 0.81 cm^2

## 2020-03-05 ENCOUNTER — HOSPITAL ENCOUNTER (OUTPATIENT)
Dept: WOUND CARE | Age: 75
Discharge: HOME | End: 2020-03-05
Payer: MEDICARE

## 2020-03-05 VITALS
SYSTOLIC BLOOD PRESSURE: 113 MMHG | OXYGEN SATURATION: 95 % | DIASTOLIC BLOOD PRESSURE: 69 MMHG | TEMPERATURE: 97.1 F | HEART RATE: 104 BPM

## 2020-03-05 PROCEDURE — 97597 DBRDMT OPN WND 1ST 20 CM/<: CPT

## 2020-03-05 NOTE — PROGRESS NOTES
Podiatry Surgery Progress Note      Patient: Chiara Diaz MRN: [id number]  SSN: [id number]    YOB: 1945  Age: 76 y.o. 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 He has an appointment for clearance on 03/16/2020 with the PCP. Continue LWC grey, offloading foam, mepilex and CAM boot. Patient has been scheduled for ABIcTBI studies 03/11/2020. Total time spent with patient: [address] discussed with: Patient, Family and Nursing Staff    Discussed:  Care Plan and home health    Disposition:  Stable      Mr. Hamzah Walton is a 76 y.o. male who was admitted for for chronic diabetic ulcer right foot. Wife states she has been changing the dressing, but has noticed  redness in his calf. He denies SOB       Subjective:   Past Medical History  No past medical history on file.   Social History     Socioeconomic History    Marital status:      Spouse name: Not on file    Number of children: Not on file    Years of education: Not on file    Highest education level: Not on file   Occupational History    Not on file   Social Needs    Financial resource strain: Not on file    Food insecurity:     Worry: Not on file     Inability: Not on file    Transportation needs:     Medical: Not on file     Non-medical: Not on file   Tobacco Use    Smoking status: Not on file   Substance and Sexual Activity    Alcohol use: Not on file    Drug use: Not on file    Sexual activity: Not on file   Lifestyle    Physical activity:     Days per week: Not on file     Minutes per session: Not on file    Stress: Not on file   Relationships    Social connections:     Talks on phone: Not on file     Gets together: Not on file     Attends Jewish service: Not on file     Active member of club or organization: Not on file     Attends meetings of clubs or organizations: Not on file     Relationship status: Not on file    Intimate partner violence:     Fear of current or ex partner: Not on file     Emotionally abused: Not on file     Physically abused: Not on file     Forced sexual activity: Not on file   Other Topics Concern    Not on file   Social History Narrative    Not on file       Current Medications  Current Outpatient Medications   Medication Sig Dispense Refill    methylPREDNISolone (MEDROL, SARATH,) 4 mg tablet Take  by mouth.  imiquimod (ALDARA) 5 % cream Apply 1.1 Each to affected area every Monday, Wednesday, Friday. apply a thin layer as directed  Indications: a roughened red patch of skin called actinic keratosis 12 Packet 1    insulin lispro protamine/insulin lispro (HUMALOG MIX 75-25,U-100,INSULN) 100 unit/mL (75-25) injection 40 Units by SubCUTAneous route Before breakfast and dinner.  insulin lispro (HUMALOG U-100 INSULIN) 100 unit/mL injection 1-10 Units by SubCUTAneous route as needed (sliding scale).  morphine IR (MS IR) 15 mg tablet Take 15 mg by mouth every four (4) hours as needed for Pain.  allopurinol (ZYLOPRIM) 300 mg tablet Take 300 mg by mouth daily.  torsemide (DEMADEX) 20 mg tablet Take 20 mg by mouth daily.  polyethylene glycol (MIRALAX) 17 gram/dose powder Take 17 g by mouth daily.  fluticasone propionate (FLONASE ALLERGY RELIEF) 50 mcg/actuation nasal spray 2 Sprays by Both Nostrils route daily.  budesonide-formoterol (SYMBICORT) 160-4.5 mcg/actuation HFAA Take 2 Puffs by inhalation two (2) times a day.  carvedilol (COREG) 25 mg tablet Take 25 mg by mouth two (2) times daily (with meals).  traMADol (ULTRAM) 50 mg tablet Take 50 mg by mouth every six (6) hours as needed for Pain.  omeprazole (PRILOSEC) 20 mg capsule Take 20 mg by mouth daily.  rivaroxaban (XARELTO) 10 mg tablet Take 10 mg by mouth daily. Patient Allergies  Allergies   Allergen Reactions    Bee Sting [Sting, Bee] Anaphylaxis    Contrast Agent [Iodine] Other (comments)    Doxycycline Hives          Objective:   General Exam  alert, cooperative, no distress, appears stated age    [de-identified]  Visit Vitals  /69   Pulse (!) 104   Temp 97.1 °F (36.2 °C)   SpO2 95%       REVIEW OF SYSTEMS:  General: denies chronic fatigue, weight loss, fever, anemia, bruising, depression, nervousness, panic attacks  HEENT: denies ringing in ears, ear infections, dizzy spells, poor vision, glaucoma, sinus trouble, hoarseness, eye infections  GI: denies diarrhea, gas, bloating, heartburn, regurgitation, difficulty swallowing, painful swallowing, nausea, vomiting, constipation, abdominal pain, decreased appetite, blood in stools, black stools, jaundice, dark urine  Lungs: denies pneumonia, asthma, cough, SOB, hemoptysis  Heart: denies chest pain, irregular heart beat, ankle swelling, HTN  Skin: denies rashes, hives, allergic reaction  Urinary: denies UTI, kidney stones, decreased urine force and flow, urination at night, blood in urine, painful urination  Bones and Joints: denies arthritis, rheumatism, back pain, gout, osteoporosis  Neurologic: denies stroke, seizures, headaches, numbness, tingling    Foot Exam  VASCULAR EXAM:. Pedal pulses are palpable 1/4 DP 1/4 PT right and left foot. Pulses heard with doppler are biphasic PT and monophasic DP Skin temperature is warm to warm right and left foot. Digital capillary fill time is 4 seconds right and left foot. There is mild edema of the right foot and ankle. There is no edema or warmth right calf and no pain elicited with palpation right     NEUROLOGICAL EXAM:. Sensation Decrease with 5.07g monofilament wire right and left foot.  Deep tendon reflexes intact and symmetrical on the right and left foot. Babinski is age appropriate, bilateral     MUSCULOSKELETAL EXAM:. Muscle tone is normal.  Muscle strength of the flexor and extensor group inversion and eversion Bilateral. 5/5.      MYCOTIC NAIL Dystrophic nail 1,2,3,4,5 bilateral. Elongated thickened nails 1,2,3,4,5 bilateral Hypertrophic nails 1,2,3,4,5     DERMATOLOGICAL EXAM:. Skin is of abnormal texture and turgor with atrophic skin changes noting hair growth, nail changes (thickening), Bilateral.There is noted erythema and edema over the lateral aspect of the 5th MPJ and distal shaft 5th metatarsal.  There is a ulcer full thickness sub met 5 right with improved measurements as stated below, and serous exudate and no odor still noted today     Ulcer  Ulcer Location: Rt. foot sub met 5, Ulcer measurements: 0.9 x 0.7 x 0.4cm  and Ulcer base: Granular/Healthy  Stewart Scale: Stage 2    Wound Foot Right;Lateral (Active)   Dressing Status Removed 2/6/2020  1:03 PM   Dressing Type Foam;Other (Comment) 2/6/2020  1:03 PM   Non-staged Wound Description Full thickness 2/6/2020  1:03 PM   Wound Length (cm) 1 cm 2/6/2020  1:03 PM   Wound Width (cm) 1 cm 2/6/2020  1:03 PM   Wound Depth (cm) 0.2 cm 2/6/2020  1:03 PM   Wound Surface Area (cm^2) 1 cm^2 2/6/2020  1:03 PM   Wound Volume (cm^3) 0.2 cm^3 2/6/2020  1:03 PM   Condition of [place] 1/30/2020 10:54 AM   Condition of Edges [place] 1/30/2020 10:54 AM   Assessment Drainage;[place] 2/6/2020  1:03 PM   Tissue Type Percent Pink 100 2/6/2020  1:03 PM   Undermining (cm) 0.2 cm 1/16/2020  8:39 AM   Direction of Undermining 7 o'clock;11 o'clock 1/16/2020  8:39 AM   Drainage Amount Moderate 2/6/2020  1:03 PM   Drainage Color Serosanguinous 2/6/2020  1:03 PM   Wound Odor Musty 2/6/2020  1:03 PM   [place]ola-wound Assessment Maceration 1/30/2020 10:54 AM   Margins Attached edges 12/19/2019  8:54 AM   Cleansing and Cleansing Agents  Dermal wound cleanser; Soap and water 2/6/2020  1:03 PM   Dressing Changed Changed/New 2/6/2020  1:03 PM   Dressing Type Applied Foam;Post-op shoe;Special tape (comment); Other (Comment) 2/6/2020  1:03 PM   Wound Procedure Type Callus Reduction 1/16/2020  8:39 AM   Procedure Time Out 0910 1/16/2020  9:30 AM   Consent Obtained  Yes 1/16/2020  9:30 AM   Number of days: 77       Wound Foot Right;Lateral;[place] (Active)   Dressing Status Removed 3/5/2020  8:15 AM   Dressing Type Foam 3/5/2020  8:15 AM   Non-staged Wound Description Full thickness 3/5/2020  8:15 AM   Shape circular 1/30/2020 10:54 AM   Wound Length (cm) 0.9 cm 3/5/2020  8:15 AM   Wound Width (cm) 0.7 cm 3/5/2020  8:15 AM   Wound Depth (cm) 0.4 cm 3/5/2020  8:15 AM   Wound Surface Area (cm^2) 0.63 cm^2 3/5/2020  8:15 AM   Wound Volume (cm^3) 0.25 cm^3 3/5/2020  8:15 AM   Change in Wound Size % -600 3/5/2020  8:15 AM   Assessment Other (Comment) 2/20/2020  8:41 AM   Tissue Type Percent Pink 100 3/5/2020  8:15 AM   Tissue Type Percent Red 70 2/20/2020  8:41 AM   Drainage Amount Moderate 3/5/2020  8:15 AM   Drainage Color Serosanguinous 2/20/2020  8:41 AM   Wound Odor None 2/20/2020  8:41 AM   Dressing Type Applied Other (Comment) 3/5/2020  8:15 AM   Wound Procedure Type Selective Debridement 3/5/2020  8:15 AM   Procedure Time Out 0845 3/5/2020  8:15 AM   Consent Obtained  Yes 3/5/2020  8:15 AM   Procedure Bleeding Moderate 3/5/2020  8:15 AM   Procedure Hemostasis  Other 3/5/2020  8:15 AM   Type of Tissue Removed  Devitalized 3/5/2020  8:15 AM   Procedure Instrument  Blade 3/5/2020  8:15 AM   Procedure Pain Scale Numeric 0/10 3/5/2020  8:15 AM   Debridement Procedure Performed by Rogelio Ward DPM 3/5/2020  8:15 AM   Post-Procedure Length (cm) 0.9 cm 2/20/2020  8:41 AM   Post-Procedure Width (cm) 0.9 cm 2/20/2020  8:41 AM   Post-Procedure Depth (cm) 0.1 cm 2/20/2020  8:41 AM   Post-Procedure Volume (cm^3) 0.08 cm^3 2/20/2020  8:41 AM   Post-Procedure Surface Area (cm^2) 0.81 cm^2 2/20/2020  8:41 AM   Number of days: 35 Labs  No results found for this or any previous visit (from the past 24 hour(s)).     X-Ray:  reviewed    Procedures:   S/p 6 applications of Vanessa Garcia  March 5, 2020

## 2020-03-09 RX ORDER — RIFAMPIN 300 MG/1
300 CAPSULE ORAL
Qty: 20 CAP | Refills: 0 | Status: SHIPPED | OUTPATIENT
Start: 2020-03-09

## 2020-06-25 ENCOUNTER — HOSPITAL ENCOUNTER (OUTPATIENT)
Dept: VASCULAR SURGERY | Age: 75
Discharge: HOME | End: 2020-06-25
Attending: PODIATRIST
Payer: MEDICARE

## 2020-06-25 DIAGNOSIS — I73.9 PVD (PERIPHERAL VASCULAR DISEASE) (HCC): ICD-10-CM

## 2020-06-25 LAB
LEFT ABI: 1.39
LEFT ANTERIOR TIBIAL: 199 MMHG
LEFT ARM BP: 143 MMHG
LEFT CALF PRESSURE: 185 MMHG
LEFT POSTERIOR TIBIAL: 194 MMHG
LEFT TBI: 0.81
LEFT TOE PRESSURE: 116 MMHG
RIGHT ABI: 1.39
RIGHT ANTERIOR TIBIAL: 165 MMHG
RIGHT ARM BP: 142 MMHG
RIGHT CALF PRESSURE: 194 MMHG
RIGHT POSTERIOR TIBIAL: 199 MMHG
RIGHT TBI: 0.66
RIGHT TOE PRESSURE: 94 MMHG

## 2020-06-25 PROCEDURE — 93923 UPR/LXTR ART STDY 3+ LVLS: CPT

## 2020-06-30 ENCOUNTER — ANESTHESIA EVENT (OUTPATIENT)
Dept: SURGERY | Age: 75
End: 2020-06-30
Payer: MEDICARE

## 2020-06-30 ENCOUNTER — HOSPITAL ENCOUNTER (OUTPATIENT)
Age: 75
Discharge: HOME | End: 2020-06-30
Attending: PODIATRIST | Admitting: PODIATRIST
Payer: MEDICARE

## 2020-06-30 ENCOUNTER — ANESTHESIA (OUTPATIENT)
Dept: SURGERY | Age: 75
End: 2020-06-30
Payer: MEDICARE

## 2020-06-30 ENCOUNTER — APPOINTMENT (OUTPATIENT)
Dept: GENERAL RADIOLOGY | Age: 75
End: 2020-06-30
Attending: PODIATRIST
Payer: MEDICARE

## 2020-06-30 VITALS
RESPIRATION RATE: 18 BRPM | HEIGHT: 66 IN | HEART RATE: 80 BPM | OXYGEN SATURATION: 96 % | WEIGHT: 218 LBS | DIASTOLIC BLOOD PRESSURE: 71 MMHG | SYSTOLIC BLOOD PRESSURE: 148 MMHG | BODY MASS INDEX: 35.03 KG/M2 | TEMPERATURE: 98 F

## 2020-06-30 DIAGNOSIS — E11.42 DIABETIC POLYNEUROPATHY ASSOCIATED WITH TYPE 2 DIABETES MELLITUS (HCC): Primary | ICD-10-CM

## 2020-06-30 DIAGNOSIS — L03.115 CELLULITIS OF RIGHT FOOT: ICD-10-CM

## 2020-06-30 DIAGNOSIS — L97.514 RIGHT FOOT ULCER, WITH NECROSIS OF BONE (HCC): ICD-10-CM

## 2020-06-30 LAB
COVID-19 RAPID TEST, COVR: NOT DETECTED
GLUCOSE BLD STRIP.AUTO-MCNC: 112 MG/DL (ref 70–110)
GLUCOSE BLD STRIP.AUTO-MCNC: 119 MG/DL (ref 70–110)
GLUCOSE BLD STRIP.AUTO-MCNC: 159 MG/DL (ref 70–110)
SOURCE, COVRS: NORMAL

## 2020-06-30 PROCEDURE — 87635 SARS-COV-2 COVID-19 AMP PRB: CPT

## 2020-06-30 PROCEDURE — 74011000272 HC RX REV CODE- 272: Performed by: PODIATRIST

## 2020-06-30 PROCEDURE — 74011636637 HC RX REV CODE- 636/637: Performed by: ANESTHESIOLOGY

## 2020-06-30 PROCEDURE — 88311 DECALCIFY TISSUE: CPT

## 2020-06-30 PROCEDURE — 77030002916 HC SUT ETHLN J&J -A: Performed by: PODIATRIST

## 2020-06-30 PROCEDURE — 88305 TISSUE EXAM BY PATHOLOGIST: CPT

## 2020-06-30 PROCEDURE — 76010000138 HC OR TIME 0.5 TO 1 HR: Performed by: PODIATRIST

## 2020-06-30 PROCEDURE — 73630 X-RAY EXAM OF FOOT: CPT

## 2020-06-30 PROCEDURE — 74011250636 HC RX REV CODE- 250/636: Performed by: NURSE ANESTHETIST, CERTIFIED REGISTERED

## 2020-06-30 PROCEDURE — 87176 TISSUE HOMOGENIZATION CULTR: CPT

## 2020-06-30 PROCEDURE — 87147 CULTURE TYPE IMMUNOLOGIC: CPT

## 2020-06-30 PROCEDURE — 76210000016 HC OR PH I REC 1 TO 1.5 HR: Performed by: PODIATRIST

## 2020-06-30 PROCEDURE — 76210000021 HC REC RM PH II 0.5 TO 1 HR: Performed by: PODIATRIST

## 2020-06-30 PROCEDURE — 74011250636 HC RX REV CODE- 250/636: Performed by: PODIATRIST

## 2020-06-30 PROCEDURE — 87077 CULTURE AEROBIC IDENTIFY: CPT

## 2020-06-30 PROCEDURE — 87075 CULTR BACTERIA EXCEPT BLOOD: CPT

## 2020-06-30 PROCEDURE — 76060000032 HC ANESTHESIA 0.5 TO 1 HR: Performed by: PODIATRIST

## 2020-06-30 PROCEDURE — 77030018836 HC SOL IRR NACL ICUM -A: Performed by: PODIATRIST

## 2020-06-30 PROCEDURE — 74011000250 HC RX REV CODE- 250: Performed by: NURSE ANESTHETIST, CERTIFIED REGISTERED

## 2020-06-30 PROCEDURE — 82962 GLUCOSE BLOOD TEST: CPT

## 2020-06-30 PROCEDURE — 87205 SMEAR GRAM STAIN: CPT

## 2020-06-30 PROCEDURE — 77030040361 HC SLV COMPR DVT MDII -B: Performed by: PODIATRIST

## 2020-06-30 PROCEDURE — 88307 TISSUE EXAM BY PATHOLOGIST: CPT

## 2020-06-30 PROCEDURE — 74011000250 HC RX REV CODE- 250: Performed by: PODIATRIST

## 2020-06-30 PROCEDURE — 87186 SC STD MICRODIL/AGAR DIL: CPT

## 2020-06-30 RX ORDER — CEFAZOLIN SODIUM 2 G/50ML
2 SOLUTION INTRAVENOUS ONCE
Status: DISCONTINUED | OUTPATIENT
Start: 2020-06-30 | End: 2020-06-30 | Stop reason: HOSPADM

## 2020-06-30 RX ORDER — MAGNESIUM SULFATE 100 %
4 CRYSTALS MISCELLANEOUS AS NEEDED
Status: DISCONTINUED | OUTPATIENT
Start: 2020-06-30 | End: 2020-06-30 | Stop reason: HOSPADM

## 2020-06-30 RX ORDER — FENTANYL CITRATE 50 UG/ML
25 INJECTION, SOLUTION INTRAMUSCULAR; INTRAVENOUS AS NEEDED
Status: DISCONTINUED | OUTPATIENT
Start: 2020-06-30 | End: 2020-06-30 | Stop reason: HOSPADM

## 2020-06-30 RX ORDER — SODIUM CHLORIDE, SODIUM LACTATE, POTASSIUM CHLORIDE, CALCIUM CHLORIDE 600; 310; 30; 20 MG/100ML; MG/100ML; MG/100ML; MG/100ML
100 INJECTION, SOLUTION INTRAVENOUS CONTINUOUS
Status: DISCONTINUED | OUTPATIENT
Start: 2020-06-30 | End: 2020-06-30 | Stop reason: HOSPADM

## 2020-06-30 RX ORDER — ONDANSETRON 2 MG/ML
INJECTION INTRAMUSCULAR; INTRAVENOUS AS NEEDED
Status: DISCONTINUED | OUTPATIENT
Start: 2020-06-30 | End: 2020-06-30 | Stop reason: HOSPADM

## 2020-06-30 RX ORDER — SODIUM CHLORIDE 0.9 % (FLUSH) 0.9 %
5-40 SYRINGE (ML) INJECTION AS NEEDED
Status: DISCONTINUED | OUTPATIENT
Start: 2020-06-30 | End: 2020-06-30 | Stop reason: HOSPADM

## 2020-06-30 RX ORDER — SODIUM CHLORIDE 0.9 % (FLUSH) 0.9 %
5-40 SYRINGE (ML) INJECTION EVERY 8 HOURS
Status: DISCONTINUED | OUTPATIENT
Start: 2020-06-30 | End: 2020-06-30 | Stop reason: HOSPADM

## 2020-06-30 RX ORDER — OXYCODONE AND ACETAMINOPHEN 5; 325 MG/1; MG/1
1 TABLET ORAL AS NEEDED
Status: DISCONTINUED | OUTPATIENT
Start: 2020-06-30 | End: 2020-06-30 | Stop reason: HOSPADM

## 2020-06-30 RX ORDER — FENTANYL CITRATE 50 UG/ML
50 INJECTION, SOLUTION INTRAMUSCULAR; INTRAVENOUS
Status: DISCONTINUED | OUTPATIENT
Start: 2020-06-30 | End: 2020-06-30 | Stop reason: HOSPADM

## 2020-06-30 RX ORDER — LIDOCAINE HYDROCHLORIDE 20 MG/ML
INJECTION, SOLUTION EPIDURAL; INFILTRATION; INTRACAUDAL; PERINEURAL AS NEEDED
Status: DISCONTINUED | OUTPATIENT
Start: 2020-06-30 | End: 2020-06-30 | Stop reason: HOSPADM

## 2020-06-30 RX ORDER — INSULIN LISPRO 100 [IU]/ML
INJECTION, SOLUTION INTRAVENOUS; SUBCUTANEOUS ONCE
Status: COMPLETED | OUTPATIENT
Start: 2020-06-30 | End: 2020-06-30

## 2020-06-30 RX ORDER — MIDAZOLAM HYDROCHLORIDE 1 MG/ML
INJECTION, SOLUTION INTRAMUSCULAR; INTRAVENOUS AS NEEDED
Status: DISCONTINUED | OUTPATIENT
Start: 2020-06-30 | End: 2020-06-30 | Stop reason: HOSPADM

## 2020-06-30 RX ORDER — CEFAZOLIN SODIUM 1 G/3ML
INJECTION, POWDER, FOR SOLUTION INTRAMUSCULAR; INTRAVENOUS AS NEEDED
Status: DISCONTINUED | OUTPATIENT
Start: 2020-06-30 | End: 2020-06-30 | Stop reason: HOSPADM

## 2020-06-30 RX ORDER — INSULIN LISPRO 100 [IU]/ML
INJECTION, SOLUTION INTRAVENOUS; SUBCUTANEOUS
Status: DISCONTINUED
Start: 2020-06-30 | End: 2020-06-30 | Stop reason: WASHOUT

## 2020-06-30 RX ORDER — PROPOFOL 10 MG/ML
INJECTION, EMULSION INTRAVENOUS AS NEEDED
Status: DISCONTINUED | OUTPATIENT
Start: 2020-06-30 | End: 2020-06-30 | Stop reason: HOSPADM

## 2020-06-30 RX ORDER — SODIUM CHLORIDE, SODIUM LACTATE, POTASSIUM CHLORIDE, CALCIUM CHLORIDE 600; 310; 30; 20 MG/100ML; MG/100ML; MG/100ML; MG/100ML
25 INJECTION, SOLUTION INTRAVENOUS CONTINUOUS
Status: DISCONTINUED | OUTPATIENT
Start: 2020-06-30 | End: 2020-06-30 | Stop reason: HOSPADM

## 2020-06-30 RX ORDER — FENTANYL CITRATE 50 UG/ML
INJECTION, SOLUTION INTRAMUSCULAR; INTRAVENOUS AS NEEDED
Status: DISCONTINUED | OUTPATIENT
Start: 2020-06-30 | End: 2020-06-30 | Stop reason: HOSPADM

## 2020-06-30 RX ADMIN — MIDAZOLAM 2 MG: 1 INJECTION INTRAMUSCULAR; INTRAVENOUS at 13:48

## 2020-06-30 RX ADMIN — FENTANYL CITRATE 50 MCG: 50 INJECTION, SOLUTION INTRAMUSCULAR; INTRAVENOUS at 14:01

## 2020-06-30 RX ADMIN — PROPOFOL 100 MG: 10 INJECTION, EMULSION INTRAVENOUS at 14:01

## 2020-06-30 RX ADMIN — SODIUM CHLORIDE, SODIUM LACTATE, POTASSIUM CHLORIDE, AND CALCIUM CHLORIDE 100 ML/HR: 600; 310; 30; 20 INJECTION, SOLUTION INTRAVENOUS at 13:37

## 2020-06-30 RX ADMIN — INSULIN LISPRO 3 UNITS: 100 INJECTION, SOLUTION INTRAVENOUS; SUBCUTANEOUS at 13:35

## 2020-06-30 RX ADMIN — ONDANSETRON 4 MG: 2 SOLUTION INTRAMUSCULAR; INTRAVENOUS at 14:16

## 2020-06-30 RX ADMIN — CEFAZOLIN 2 G: 1 INJECTION, POWDER, FOR SOLUTION INTRAVENOUS at 14:07

## 2020-06-30 RX ADMIN — LIDOCAINE HYDROCHLORIDE 40 MG: 20 INJECTION, SOLUTION INTRAVENOUS at 14:01

## 2020-06-30 NOTE — BRIEF OP NOTE
Brief Postoperative Note    Patient: Hernando Herman  YOB: 1945  MRN: [id number]    Date of Procedure: 6/30/2020     Pre-Op Diagnosis: L97.522 E11.42    Post-Op Diagnosis: Same as preoperative diagnosis. Procedure(s): Wide Excision of Ulcer Right Foot, Resection of 5th Metatarsal Head    Surgeon(s):   Mariah Chris DPM    Surgical Assistant: Mario Alberto Paige    Anesthesia: General     Estimated Blood Loss (mL): Minimal    Complications: None    Specimens:   ID Type Source Tests Collected by Time Destination   1 : Right 5th metatarsal bone Preservative Foot, Right  Mariah Chris DPM 6/30/2020 1418 Pathology        Implants: * No implants in log *    Drains: * No LDAs found *    Findings: Ulcer with 5th metatarsal head    Electronically Signed by Jose Alejandro Gonzalez DPM on 6/30/2020 at 2:22 PM

## 2020-06-30 NOTE — PERIOP NOTES
Pre-Op Summary    Pt arrived via car with family/friend and is oriented to time, place, person and situation. Patient with steady gait with none assistive devices. Visit Vitals  /84 (BP 1 Location: Right arm, BP Patient Position: At rest)   Pulse 82   Temp 97.7 °F (36.5 °C)   Resp 16   Ht 5' 6\" (1.676 m)   Wt 98.9 kg (218 lb)   SpO2 100%   BMI 35.19 kg/m²       Peripheral IV located on Right hand . Patients belongings are located locked in store room. Patient's point of contact is Merlin Garcia, wife and their contact number is: [phone]. They will be called for . They are able to receive medication information. They will be their ride home.

## 2020-06-30 NOTE — H&P
Patient was seen and examined in pre op holding. COVID 19 testing has been performed.  Pre op H&P reviewed and updated with no chages

## 2020-06-30 NOTE — ANESTHESIA POSTPROCEDURE EVALUATION
Procedure(s): Wide Excision of Ulcer Right Foot, Resection of 5th Metatarsal Head. MAC, general - backup    Anesthesia Post Evaluation      Multimodal analgesia: multimodal analgesia used between 6 hours prior to anesthesia start to PACU discharge  Patient location during evaluation: bedside  Patient participation: complete - patient participated  Level of consciousness: awake  Pain management: adequate  Airway patency: patent  Anesthetic complications: no  Cardiovascular status: stable  Respiratory status: acceptable  Hydration status: acceptable  Post anesthesia nausea and vomiting:  controlled      INITIAL Post-op Vital signs:   Vitals Value Taken Time   /81 6/30/2020  3:49 PM   Temp 36.6 °C (97.8 °F) 6/30/2020  3:19 PM   Pulse 81 6/30/2020  3:52 PM   Resp 22 6/30/2020  3:52 PM   SpO2 94 % 6/30/2020  3:52 PM   Vitals shown include unvalidated device data.

## 2020-06-30 NOTE — ANESTHESIA PREPROCEDURE EVALUATION
Relevant Problems   No relevant active problems       Anesthetic History   No history of anesthetic complications            Review of Systems / Medical History  Patient summary reviewed and pertinent labs reviewed    Pulmonary    COPD: moderate    Sleep apnea: BiPAP           Neuro/Psych   Within defined limits           Cardiovascular            Dysrhythmias : atrial fibrillation  Pacemaker    Exercise tolerance: >4 METS  Comments: Pacemaker paced 2 months ago   GI/Hepatic/Renal  Within defined limits              Endo/Other    Diabetes: type 2    Obesity     Other Findings   Comments:                  Physical Exam    Airway  Mallampati: III  TM Distance: 4 - 6 cm  Neck ROM: normal range of motion   Mouth opening: Normal     Cardiovascular  Regular rate and rhythm,  S1 and S2 normal,  no murmur, click, rub, or gallop  Rhythm: regular  Rate: normal         Dental      Comments: Some missing teeth   Pulmonary  Breath sounds clear to auscultation               Abdominal  GI exam deferred       Other Findings            Anesthetic Plan    ASA: 4  Anesthesia type: MAC and general - backup          Induction: Intravenous  Anesthetic plan and risks discussed with: Patient

## 2020-06-30 NOTE — DISCHARGE INSTRUCTIONS
Patient Education        Toe Amputation: What to Expect at [address] had amputation surgery to remove one or more of your toes. For most people, pain improves within a week after surgery. You may have stitches or sutures. The doctor will probably take these out about 10 days after the surgery. You may need to wear a cast or a special type of shoe for about 2 to 4 weeks. You may think you have feeling or pain where your toe had been. This is called phantom pain. It is common, and it may come and go for a year or longer. If you have this kind of pain, your doctor may prescribe medicine to treat it. This care sheet gives you a general idea about how long it will take for you to recover. But each person recovers at a different pace. Follow the steps below to get better as quickly as possible. How can you care for yourself at home? Activity  · Rest when you feel tired. Getting enough sleep will help you recover. · Follow your doctor's instructions about how much weight you can put on your foot and when you can go back to your usual activities. If you were given crutches, use them as directed. · Try to walk each day if you are able. Start by walking a little more than you did the day before. Bit by bit, increase the amount you walk. Walking boosts blood flow and helps prevent blood clots. · You may notice some changes in your balance when you walk. Your balance will improve over time. · Prop up your foot and leg on a pillow when you ice it or anytime you sit or lie down during the next 3 days. Try to keep it above the level of your heart. This will help reduce swelling. · Ask your doctor when you can drive again. · You may shower, unless your doctor tells you not to. Keep the bandage dry. If the bandage has been removed, you can wash the area with warm water and soap. Pat the area dry. · You will probably need to take about 4 weeks off from work or your normal routine.  How much time you need to take off depends on the type of work you do and your overall health. Diet  · You can eat your normal diet. If your stomach is upset, try bland, low-fat foods like plain rice, broiled chicken, toast, and yogurt. · You may notice that your bowel movements are not regular right after your surgery. This is common. Try to avoid constipation and straining with bowel movements. You may want to take a fiber supplement every day. If you have not had a bowel movement after a couple of days, ask your doctor about taking a mild laxative. Medicines  · Your doctor will tell you if and when you can restart your medicines. He or she will also give you instructions about taking any new medicines. · If you take aspirin or some other blood thinner, ask your doctor if and when to start taking it again. Make sure that you understand exactly what your doctor wants you to do. · Take pain medicines exactly as directed. ? If the doctor gave you a prescription medicine for pain, take it as prescribed. ? If you are not taking a prescription pain medicine, ask your doctor if you can take an over-the-counter medicine. · If your doctor prescribed antibiotics, take them as directed. Do not stop taking them just because you feel better. You need to take the full course of antibiotics. · If you think your pain medicine is making you sick to your stomach:  ? Take your medicine after meals (unless your doctor has told you not to). ? Ask your doctor for a different pain medicine. Incision care  · Your doctor will probably remove the bandages after several days. Or your doctor may have you remove your bandages at home. Do not touch the surgery area. Keep it dry. · Do not soak your foot until your doctor says it is okay. Follow-up care is a key part of your treatment and safety. Be sure to make and go to all appointments, and call your doctor if you are having problems.  It's also a good idea to know your test results and keep a list of the medicines you take. When should you call for help? [id number] anytime you think you may need emergency care. For example, call if:  · You passed out (lost consciousness). · You have sudden chest pain, are short of breath, or you cough up blood. Call your doctor now or seek immediate medical care if:  · You have pain that does not get better after you take pain medicine. · You are sick to your stomach or cannot drink fluids. · You have loose stitches, or your incision comes open. · You have signs of a blood clot in your leg (called a deep vein thrombosis), such as:  ? Pain in your calf, back of the knee, thigh, or groin. ? Redness or swelling in your leg. · You have signs of infection, such as:  ? Increased pain, swelling, warmth, or redness. ? Red streaks leading from the incision. ? Pus draining from the incision. ? A fever. · You bleed through your bandage. Watch closely for any changes in your health, and be sure to contact your doctor if you have any problems. Where can you learn more? Go to http://miriam-kojo.info/  Enter X742 in the search box to learn more about \"Toe Amputation: What to Expect at Home. \"  Current as of: March 2, 2020               Content Version: 12.5  © 7626-4050 [employer]. Care instructions adapted under license by [employer] (which disclaims liability or warranty for this information). If you have questions about a medical condition or this instruction, always ask your healthcare professional. [place] [address] any warranty or liability for your use of this information.          DISCHARGE SUMMARY from Nurse    PATIENT INSTRUCTIONS:    After general anesthesia or intravenous sedation, for 24 hours or while taking prescription Narcotics:  · Limit your activities  · Do not drive and operate hazardous machinery  · Do not make important personal or business decisions  · Do  not drink alcoholic beverages  · If you have not urinated within 8 hours after discharge, please contact your surgeon on call. Report the following to your surgeon:  · Excessive pain, swelling, redness or odor of or around the surgical area  · Temperature over 100.5  · Nausea and vomiting lasting longer than 4 hours or if unable to take medications  · Any signs of decreased circulation or nerve impairment to extremity: change in color, persistent  numbness, tingling, coldness or increase pain  · Any questions    What to do at Home:  Recommended activity: Activity as tolerated and no driving for today, These are general instructions for a healthy lifestyle:    No smoking/ No tobacco products/ Avoid exposure to second hand smoke  Surgeon General's Warning:  Quitting smoking now greatly reduces serious risk to your health. Obesity, smoking, and sedentary lifestyle greatly increases your risk for illness    A healthy diet, regular physical exercise & weight monitoring are important for maintaining a healthy lifestyle    You may be retaining fluid if you have a history of heart failure or if you experience any of the following symptoms:  Weight gain of 3 pounds or more overnight or 5 pounds in a week, increased swelling in our hands or feet or shortness of breath while lying flat in bed. Please call your doctor as soon as you notice any of these symptoms; do not wait until your next office visit. The discharge information has been reviewed with the patient. The patient verbalized understanding. Discharge medications reviewed with the patient and appropriate educational materials and side effects teaching were provided.   ___________________________________________________________________________________________________________________________________  Patient armband removed and shredded

## 2020-07-01 NOTE — OP NOTES
[facility]  OPERATIVE REPORT    Name:  Estella Rosado  MR#:   [id number]  :  1945  ACCOUNT #:  [de-identified]  DATE OF SERVICE:  2020    PREOPERATIVE DIAGNOSIS:  Chronic diabetic ulcer with cellulitis, rule out osteomyelitis. POSTOPERATIVE DIAGNOSIS:  Chronic diabetic ulcer with cellulitis, rule out osteomyelitis. PROCEDURE PERFORMED:  Wide excision of diabetic ulcer with resection of fifth metatarsal head with lavage, right foot. SURGEON:  Narcisa Cuevas DPM    ASSISTANT:  Genaro Potter    ANESTHESIA:  General.    HEMOSTASIS:  Ankle tourniquet at 250 mmHg. MATERIAL:  2-0 nylon. COMPLICATIONS:  None. SPECIMENS REMOVED:  Distal 5th Metatarsal    IMPLANTS:  none    ESTIMATED BLOOD LOSS:  Minimal.    INDICATION:  The patient is a pleasant 17-year-old diabetic male who was originally seen in the Wound clinic with a longstanding ulcer on the plantar aspect of his right foot times several months. The patient relocated from the [place], was treated up there with the ulcer, and was seen at the [address] [place] [address] in the  of  and was treated conservatively with grafting and serial debridements as well as antibiotics as well as offloading, and the ulcer failed to progress. The patient would benefit from surgical intervention at this time. PROCEDURE:  The patient was brought to the operating room and placed in a supine position. At this time, he received general anesthetic, and a time-out was performed. The right foot was then scrubbed, prepped, and draped in normal aseptic manner. At this time, an Esmarch was used to exsanguinate the right foot and then ankle tourniquet was inflated to 250 mmHg.   A second time-out was performed, and laterality was confirmed, and after it was determined he was anesthetized, with a #15 blade, a plantar linear incision was performed over the plantar aspect of the base of the proximal phalanx down to the neck of the fifth metatarsal.  This incision was deepened with sharp dissection. The ulcer which measured 2.0 x 1.9 x 0.3 was then sharply dissected with a specific elliptical incision, making sure to freshen all of the wound edges. Once this was obtained, further dissection continued, delivering the head of the fifth metatarsal into the surgical field. The TPS saw was utilized to transect the head of the fifth metatarsal.  At this time, aerobic and anaerobic cultures of the head were performed and the remaining dissected ulcer as well as the remaining resected fifth metatarsal head will be sent to pathology. The area was then flushed with copious amount of saline solution and inspected, and any further necrotic tissue was sharply removed with a #15 blade at this time. A small area undermining to loosen the skin edges was then performed over the base of the proximal phalanx as well as the remaining shaft of the fifth metatarsal.  The skin edges were then reapproximated with 2-0 nylon with minimal tension via simple sutures. The closed incision site was then dressed with Xeroform, 4x4s, fluffs, and Pratima. Tourniquet was deflated. Adequate perfusion one through five on the right was noted. The patient tolerated the procedure and anesthesia well and left the OR for PACU in satisfactory condition. The patient will be seen in the office in 48 hours for his first postoperative visit. He is to remain nonweightbearing with a CAM boot, and he has been placed on Bactrim DS empirically pending results of the intra op culture.       [email or web address] [email or web address]  D:  06/30/2020 14:42  T:  06/30/2020 21:55  JOB #:  [id number]

## 2020-07-02 LAB
BACTERIA SPEC CULT: NORMAL
SERVICE CMNT-IMP: NORMAL

## 2020-07-23 ENCOUNTER — HOSPITAL ENCOUNTER (OUTPATIENT)
Dept: CARDIAC CATH/INVASIVE PROCEDURES | Age: 75
Discharge: HOME | End: 2020-07-23
Attending: RADIOLOGY | Admitting: RADIOLOGY
Payer: MEDICARE

## 2020-07-23 VITALS
SYSTOLIC BLOOD PRESSURE: 141 MMHG | BODY MASS INDEX: 34.13 KG/M2 | TEMPERATURE: 98.2 F | RESPIRATION RATE: 16 BRPM | DIASTOLIC BLOOD PRESSURE: 67 MMHG | WEIGHT: 212.4 LBS | OXYGEN SATURATION: 100 % | HEART RATE: 80 BPM | HEIGHT: 66 IN

## 2020-07-23 DIAGNOSIS — L08.9 WOUND INFECTION: ICD-10-CM

## 2020-07-23 DIAGNOSIS — T14.8XXA WOUND INFECTION: ICD-10-CM

## 2020-07-23 PROCEDURE — C1751 CATH, INF, PER/CENT/MIDLINE: HCPCS

## 2020-07-23 PROCEDURE — 36573 INSJ PICC RS&I 5 YR+: CPT

## 2020-07-23 RX ORDER — ALBUTEROL SULFATE 0.83 MG/ML
2.5 SOLUTION RESPIRATORY (INHALATION) AS NEEDED
Status: CANCELLED
Start: 2020-07-24

## 2020-07-23 RX ORDER — HEPARIN 100 UNIT/ML
500 SYRINGE INTRAVENOUS AS NEEDED
Status: CANCELLED | OUTPATIENT
Start: 2020-07-24

## 2020-07-23 RX ORDER — DIPHENHYDRAMINE HYDROCHLORIDE 50 MG/ML
50 INJECTION, SOLUTION INTRAMUSCULAR; INTRAVENOUS AS NEEDED
Status: CANCELLED
Start: 2020-07-24

## 2020-07-23 RX ORDER — SODIUM CHLORIDE 0.9 % (FLUSH) 0.9 %
5-10 SYRINGE (ML) INJECTION AS NEEDED
Status: CANCELLED | OUTPATIENT
Start: 2020-07-24

## 2020-07-23 RX ORDER — ONDANSETRON 2 MG/ML
8 INJECTION INTRAMUSCULAR; INTRAVENOUS AS NEEDED
Status: CANCELLED | OUTPATIENT
Start: 2020-07-24

## 2020-07-23 RX ORDER — SODIUM CHLORIDE 9 MG/ML
10 INJECTION INTRAMUSCULAR; INTRAVENOUS; SUBCUTANEOUS AS NEEDED
Status: CANCELLED | OUTPATIENT
Start: 2020-07-24

## 2020-07-23 RX ORDER — DIPHENHYDRAMINE HYDROCHLORIDE 50 MG/ML
25 INJECTION, SOLUTION INTRAMUSCULAR; INTRAVENOUS AS NEEDED
Status: CANCELLED
Start: 2020-07-24

## 2020-07-23 RX ORDER — HYDROCORTISONE SODIUM SUCCINATE 100 MG/2ML
100 INJECTION, POWDER, FOR SOLUTION INTRAMUSCULAR; INTRAVENOUS AS NEEDED
Status: CANCELLED | OUTPATIENT
Start: 2020-07-24

## 2020-07-23 RX ORDER — EPINEPHRINE 1 MG/ML
0.3 INJECTION, SOLUTION, CONCENTRATE INTRAVENOUS AS NEEDED
Status: CANCELLED | OUTPATIENT
Start: 2020-07-24

## 2020-07-23 RX ORDER — ACETAMINOPHEN 325 MG/1
650 TABLET ORAL AS NEEDED
Status: CANCELLED
Start: 2020-07-24

## 2020-07-23 NOTE — DISCHARGE INSTRUCTIONS
Patient Education   Patient Education     DISCHARGE SUMMARY from Nurse    PATIENT INSTRUCTIONS:    After general anesthesia or intravenous sedation, for 24 hours or while taking prescription Narcotics:  · Limit your activities  · Do not drive and operate hazardous machinery  · Do not make important personal or business decisions  · Do  not drink alcoholic beverages    Report the following to your surgeon:  · Excessive pain, swelling, redness or odor of or around the surgical area  · Temperature over 100.5  · Nausea and vomiting lasting longer than 4 hours or if unable to take medications  · Any questions        *  Please give a list of your current medications to your Primary Care Provider. *  Please update this list whenever your medications are discontinued, doses are      changed, or new medications (including over-the-counter products) are added. *  Please carry medication information at all times in case of emergency situations. These are general instructions for a healthy lifestyle:    No smoking/ No tobacco products/ Avoid exposure to second hand smoke  Surgeon General's Warning:  Quitting smoking now greatly reduces serious risk to your health. Obesity, smoking, and sedentary lifestyle greatly increases your risk for illness    A healthy diet, regular physical exercise & weight monitoring are important for maintaining a healthy lifestyle    You may be retaining fluid if you have a history of heart failure or if you experience any of the following symptoms:  Weight gain of 3 pounds or more overnight or 5 pounds in a week, increased swelling in our hands or feet or shortness of breath while lying flat in bed. Please call your doctor as soon as you notice any of these symptoms; do not wait until your next office visit. Patient armband removed and shredded  The discharge information has been reviewed with the patient. The patient verbalized understanding.   Discharge medications reviewed with the patient and appropriate educational materials and side effects teaching were provided. ___________________________________________________________________________________________________________________________________Learning About Coronavirus (COVID-19)  Coronavirus (COVID-19): Overview  What is coronavirus (VUYRN-46)? The coronavirus disease (COVID-19) is caused by a virus. It is an illness that was first found in [place], [place], in December 2019. It has since spread worldwide. The virus can cause fever, cough, and trouble breathing. In severe cases, it can cause pneumonia and make it hard to breathe without help. It can cause death. Coronaviruses are a large group of viruses. They cause the common cold. They also cause more serious illnesses like Middle East respiratory syndrome (MERS) and severe acute respiratory syndrome (SARS). COVID-19 is caused by a novel coronavirus. That means it's a new type that has not been seen in people before. This virus spreads person-to-person through droplets from coughing and sneezing. It can also spread when you are close to someone who is infected. And it can spread when you touch something that has the virus on it, such as a doorknob or a tabletop. What can you do to protect yourself from coronavirus (COVID-19)? The best way to protect yourself from getting sick is to:  · Avoid areas where there is an outbreak. · Avoid contact with people who may be infected. · Wash your hands often with soap or alcohol-based hand sanitizers. · Avoid crowds and try to stay at least 6 feet away from other people. · Wash your hands often, especially after you cough or sneeze. Use soap and water, and scrub for at least 20 seconds. If soap and water aren't available, use an alcohol-based hand . · Avoid touching your mouth, nose, and eyes. What can you do to avoid spreading the virus to others?   To help avoid spreading the virus to others:  · Cover your mouth with a tissue when you cough or sneeze. Then throw the tissue in the trash. · Use a disinfectant to clean things that you touch often. · Stay home if you are sick or have been exposed to the virus. Don't go to school, work, or public areas. And don't use public transportation. · If you are sick:  ? Leave your home only if you need to get medical care. But call the doctor's office first so they know you're coming. And wear a face mask, if you have one.  ? If you have a face mask, wear it whenever you're around other people. It can help stop the spread of the virus when you cough or sneeze. ? Clean and disinfect your home every day. Use household  and disinfectant wipes or sprays. Take special care to clean things that you grab with your hands. These include doorknobs, remote controls, phones, and handles on your refrigerator and microwave. And don't forget countertops, tabletops, bathrooms, and computer keyboards. When to call for help  Call 911 anytime you think you may need emergency care. For example, call if:  · You have severe trouble breathing. (You can't talk at all.)  · You have constant chest pain or pressure. · You are severely dizzy or lightheaded. · You are confused or can't think clearly. · Your face and lips have a blue color. · You pass out (lose consciousness) or are very hard to wake up. Call your doctor now if you develop symptoms such as:  · Shortness of breath. · Fever. · Cough. If you need to get care, call ahead to the doctor's office for instructions before you go. Make sure you wear a face mask, if you have one, to prevent exposing other people to the virus. Where can you get the latest information? The following health organizations are tracking and studying this virus. Their websites contain the most up-to-date information. Jocelin Hope also learn what to do if you think you may have been exposed to the virus. · U.S. Centers for Disease Control and Prevention (CDC):  The CDC provides updated news about the disease and travel advice. The website also tells you how to prevent the spread of infection. www.cdc.gov  · World Health Organization [facility]): WHO offers information about the virus outbreaks. WHO also has travel advice. www.who.int  Current as of: April 1, 2020               Content Version: 12.4  © 2006-2020 Healthwise, [employer]. Care instructions adapted under license by your healthcare professional. If you have questions about a medical condition or this instruction, always ask your healthcare professional. [address] any warranty or liability for your use of this information. Peripherally Inserted Central Catheter (PICC): Care Instructions  Your Care Instructions     A peripherally inserted central catheter (PICC) is a soft, flexible tube that runs under your skin from a vein in your arm to a large vein near your heart. One end of the catheter stays outside your body. It is a type of central venous catheter, or central venous line. You may have it for weeks or months. A PICC is used to give you medicine, blood products, nutrients, or fluids. A PICC makes doing these things more comfortable for you because they are put directly into the catheter. So you will not be stuck with a needle every time. A PICC may be used to draw blood for tests only if another vein, such as in the hand or arm, can't be used. The end of the PICC sometimes has two or three openings so that you can get more than one type of fluid or medicine at a time. Your doctor may give you medicine to make you feel relaxed. You may feel a little pain when your doctor numbs your arm. Your doctor will then thread the catheter up a vein in your arm to a larger vein. You will not feel any pain. The doctor may use stitches or other devices to hold the catheter in place where it exits your arm. After the procedure, the site may be sore for a day or two.   Follow-up care is a key part of your treatment and safety. Be sure to make and go to all appointments, and call your doctor if you are having problems. It's also a good idea to know your test results and keep a list of the medicines you take. How can you care for yourself at home? · Do not wear jewelry, such as necklaces, that can catch on the catheter. · If the catheter breaks, follow the instructions your doctor gave you. If you have no instructions, clamp or tie off the catheter. Then see a doctor as soon as possible. · To help prevent infection, take a shower instead of a bath. Do not go swimming with the catheter. · Try to keep the area dry. When you shower, cover the area with waterproof material, such as plastic wrap. · Never touch the open end of the catheter if the cap is off. · Never use scissors, knives, pins, or other sharp objects near the catheter or other tubing. · If your catheter has a clamp, keep it clamped when you are not using it. · Fasten or tape the catheter to your body to prevent pulling or dangling. · Avoid clothing that rubs or pulls on your catheter. · Avoid bending or crimping your catheter. · Always wash your hands before you touch your catheter. · Wear loose clothing over the catheter for the first 10 to 14 days. When getting dressed, be careful not to pull on the catheter. How to change the dressing  Since the PICC is in one of your arms, you will not be able to change the dressing on your own. You will need someone to help you change the dressing using the same instructions that your doctor or nurse gave you. Your PICC dressing should be changed at least once a week. If the dressing becomes loose, wet, or dirty, it must be changed more often to prevent infection. Your doctor may also give you instructions for when to change the dressing. Be sure you have all your supplies ready. These include medical tape, a surgical mask, sterile gloves, and your dressing kit.  The names and brands of the items will vary. Your doctor or nurse may give you specific instructions for changing the dressing. Here are basic tips for how to change the dressing. You will need help changing it. 1. Wash your hands with soap and water for 15 seconds. Dry your hands with paper towels. 2. Put on the surgical mask. 3. Loosen and remove your old dressing. Peel the dressing toward the PICC, not away from it. You may need to use an adhesive remover if your dressing does not come off easily. 4. Look at the site carefully for redness, swelling, drainage, tenderness, or warmth. If you notice any of these, call your doctor. 5. Wash your hands again, and open your dressing kit. Put on the sterile gloves. 6. Clean the site with the supplies in the dressing kit. 7. Use the dressing that your doctor gave you, and place it over the site. 8. Tape the PICC tubing to your skin so that it does not dangle or pull. When should you call for help? [id number] anytime you think you may need emergency care. For example, call if:  · You passed out (lost consciousness). · You have severe trouble breathing. · You have sudden chest pain and shortness of breath, or you cough up blood. · You have a fast or uneven pulse. Call your doctor now or seek immediate medical care if:  · You have signs of infection, such as:  ? Increased pain, swelling, warmth, or redness. ? Red streaks leading from the area. ? Pus or blood draining from the area. ? A fever. · You have swelling in your face, chest, neck, or arm on the side where the catheter is. · You have signs of a blood clot, such as bulging veins near the catheter. · Your catheter is leaking, cracked, or clogged. · You feel resistance when you inject medicine or fluids into your catheter. · Your catheter is out of place. This may happen after severe coughing or vomiting, or if you pull on the catheter. · You have chest pain or shortness of breath.   Watch closely for changes in your health, and be sure to contact your doctor if:  · You have any concerns about your catheter. Where can you learn more? Go to http://miriam-kojo.info/  Enter L935 in the search box to learn more about \"Peripherally Inserted Central Catheter (PICC): Care Instructions. \"  Current as of: June 26, 2019               Content Version: 12.5  © 5523-5632 [employer]. Care instructions adapted under license by [employer] (which disclaims liability or warranty for this information). If you have questions about a medical condition or this instruction, always ask your healthcare professional. [address] any warranty or liability for your use of this information.

## 2020-07-23 NOTE — PROCEDURES
Vascular & Interventional Radiology Brief Procedure Note    Interventional Radiologist: Bree Card MD    Pre-operative Diagnosis:  PICC line    Post-operative Diagnosis: Same as pre-op dx    Procedure(s) Performed:  same    Anesthesia:  Local and Moderate Sedation    Findings:  Uncomplicated R arm picc line, ready for use.      Complications: None    Estimated Blood Loss:  minimal    Tubes and Drains: None    Specimens: None    Condition: Good       Bree Card MD  [facility]  Vascular & Interventional Radiology  7/23/2020

## 2020-07-23 NOTE — PROGRESS NOTES
TRANSFER - OUT REPORT:    Verbal report given to VITOR Bower(name) on Jenni Galaviz being transferred to [facility](unit) for routine progression of care       Report consisted of patient's Situation, Background, Assessment and   Recommendations(SBAR). Information from the following report(s) SBAR was reviewed with the receiving nurse. Opportunity for questions and clarification was provided.       Patient transported with:   [employer]

## 2020-07-23 NOTE — PERIOP NOTES
Mr. Emilia Viera is a 76year old male. He is alert x 3 and hard of hearing on both ears. Denies any pain. He has a special boot on his right leg due to a fracture and cyst.  Needs a wheelchair and can stand up with assistance. He is non weight bearing on his right leg. Patient Vitals for the past 12 hrs:   Temp Pulse Resp BP SpO2   07/23/20 1359 -- -- -- 101/50 --   07/23/20 1351 98.2 °F (36.8 °C) 85 18 -- 97 %     He denies any dizziness upon sitting on lying down. Reports he ate and had his medications today. His wife Janie Lambert will pick him up.  Her phone number is [phone]

## 2020-07-23 NOTE — H&P
The patient is an appropriate candidate to undergo PICC. Patient assessed immediately prior to induction. Anesthesia plan as follows: Local/No Anesthesia. History and Physical update:  H&P was reviewed and the patient was examined. No changes have occurred in the patient's condition since the H&P was completed.     Roge Cuellar MD  Vascular & Interventional Radiology  [address] [place] [address] [facility]  7/23/2020

## 2020-07-24 ENCOUNTER — HOSPITAL ENCOUNTER (OUTPATIENT)
Dept: INFUSION THERAPY | Age: 75
Discharge: HOME | End: 2020-07-24
Payer: MEDICARE

## 2020-07-24 VITALS
OXYGEN SATURATION: 100 % | SYSTOLIC BLOOD PRESSURE: 133 MMHG | DIASTOLIC BLOOD PRESSURE: 69 MMHG | TEMPERATURE: 97 F | RESPIRATION RATE: 18 BRPM | HEART RATE: 84 BPM

## 2020-07-24 DIAGNOSIS — L97.512 RIGHT FOOT ULCER, WITH FAT LAYER EXPOSED (HCC): ICD-10-CM

## 2020-07-24 DIAGNOSIS — L97.514 RIGHT FOOT ULCER, WITH NECROSIS OF BONE (HCC): ICD-10-CM

## 2020-07-24 DIAGNOSIS — L03.115 CELLULITIS OF RIGHT FOOT: ICD-10-CM

## 2020-07-24 DIAGNOSIS — L97.514 RIGHT FOOT ULCER, WITH NECROSIS OF BONE (HCC): Primary | ICD-10-CM

## 2020-07-24 PROCEDURE — 74011250636 HC RX REV CODE- 250/636

## 2020-07-24 PROCEDURE — 74011000250 HC RX REV CODE- 250: Performed by: INTERNAL MEDICINE

## 2020-07-24 PROCEDURE — 96374 THER/PROPH/DIAG INJ IV PUSH: CPT

## 2020-07-24 PROCEDURE — 74011250636 HC RX REV CODE- 250/636: Performed by: INTERNAL MEDICINE

## 2020-07-24 RX ORDER — HEPARIN 100 UNIT/ML
SYRINGE INTRAVENOUS
Status: COMPLETED
Start: 2020-07-24 | End: 2020-07-24

## 2020-07-24 RX ORDER — DIPHENHYDRAMINE HYDROCHLORIDE 50 MG/ML
50 INJECTION, SOLUTION INTRAMUSCULAR; INTRAVENOUS AS NEEDED
Status: CANCELLED
Start: 2020-07-25

## 2020-07-24 RX ORDER — ACETAMINOPHEN 325 MG/1
650 TABLET ORAL AS NEEDED
Status: CANCELLED
Start: 2020-07-25

## 2020-07-24 RX ORDER — HEPARIN 100 UNIT/ML
500 SYRINGE INTRAVENOUS ONCE
Status: COMPLETED | OUTPATIENT
Start: 2020-07-24 | End: 2020-07-24

## 2020-07-24 RX ORDER — SODIUM CHLORIDE 0.9 % (FLUSH) 0.9 %
5-10 SYRINGE (ML) INJECTION AS NEEDED
Status: CANCELLED | OUTPATIENT
Start: 2020-07-25

## 2020-07-24 RX ORDER — HYDROCORTISONE SODIUM SUCCINATE 100 MG/2ML
100 INJECTION, POWDER, FOR SOLUTION INTRAMUSCULAR; INTRAVENOUS AS NEEDED
Status: CANCELLED | OUTPATIENT
Start: 2020-07-25

## 2020-07-24 RX ORDER — HEPARIN 100 UNIT/ML
500 SYRINGE INTRAVENOUS AS NEEDED
Status: CANCELLED | OUTPATIENT
Start: 2020-07-25

## 2020-07-24 RX ORDER — SODIUM CHLORIDE 0.9 % (FLUSH) 0.9 %
10-40 SYRINGE (ML) INJECTION ONCE
Status: COMPLETED | OUTPATIENT
Start: 2020-07-24 | End: 2020-07-24

## 2020-07-24 RX ORDER — SODIUM CHLORIDE 9 MG/ML
10 INJECTION INTRAMUSCULAR; INTRAVENOUS; SUBCUTANEOUS AS NEEDED
Status: CANCELLED | OUTPATIENT
Start: 2020-07-25

## 2020-07-24 RX ORDER — EPINEPHRINE 1 MG/ML
0.3 INJECTION, SOLUTION, CONCENTRATE INTRAVENOUS AS NEEDED
Status: CANCELLED | OUTPATIENT
Start: 2020-07-25

## 2020-07-24 RX ORDER — ONDANSETRON 2 MG/ML
8 INJECTION INTRAMUSCULAR; INTRAVENOUS AS NEEDED
Status: CANCELLED | OUTPATIENT
Start: 2020-07-25

## 2020-07-24 RX ORDER — ALBUTEROL SULFATE 0.83 MG/ML
2.5 SOLUTION RESPIRATORY (INHALATION) AS NEEDED
Status: CANCELLED
Start: 2020-07-25

## 2020-07-24 RX ORDER — DIPHENHYDRAMINE HYDROCHLORIDE 50 MG/ML
25 INJECTION, SOLUTION INTRAMUSCULAR; INTRAVENOUS AS NEEDED
Status: CANCELLED
Start: 2020-07-25

## 2020-07-24 RX ADMIN — HEPARIN 500 UNITS: 100 SYRINGE at 10:29

## 2020-07-24 RX ADMIN — Medication 10 ML: at 10:29

## 2020-07-24 RX ADMIN — DAPTOMYCIN 600 MG: 500 INJECTION, POWDER, LYOPHILIZED, FOR SOLUTION INTRAVENOUS at 10:27

## 2020-07-24 NOTE — PROGRESS NOTES
[facility] Progress Note    Date: 2020    Name: Zoltan Diana    MRN: [id number]         : 1945    Daptomycin IVP    Mr. Phil Bergeron was assessed and education was provided. Mr. Abdi Kemp vitals were reviewed. Visit Vitals  /69 (BP 1 Location: Left arm, BP Patient Position: Sitting)   Pulse 84   Temp 97 °F (36.1 °C)   Resp 18   SpO2 100%           []  Vancomycin     []  Invanz     [x]  Cubicin 600mg     []  Rocephin    was pushed over 2 minutes as ordered. Pt with Right upper arm double lumen PICC line. Each lumen flushes without difficulty and positive for blood return. Both lumens flushed with 20mL NS followed by 500 units of Heparin. Mr. Phil Bergeorn tolerated infusion, and had no complaints at this time. Patient armband removed and shredded. Mr. Phil Bergeron was discharged from [place] [address] in stable condition at 1045. [facility] his next [facility].     Jyothi Kumar RN  2020  1045

## 2020-07-25 ENCOUNTER — HOSPITAL ENCOUNTER (OUTPATIENT)
Dept: INFUSION THERAPY | Age: 75
Discharge: HOME | End: 2020-07-25
Payer: MEDICARE

## 2020-07-25 VITALS
OXYGEN SATURATION: 99 % | SYSTOLIC BLOOD PRESSURE: 145 MMHG | RESPIRATION RATE: 18 BRPM | DIASTOLIC BLOOD PRESSURE: 70 MMHG | TEMPERATURE: 97.2 F | HEART RATE: 94 BPM

## 2020-07-25 DIAGNOSIS — L97.514 RIGHT FOOT ULCER, WITH NECROSIS OF BONE (HCC): ICD-10-CM

## 2020-07-25 DIAGNOSIS — L03.115 CELLULITIS OF RIGHT FOOT: ICD-10-CM

## 2020-07-25 DIAGNOSIS — L97.512 RIGHT FOOT ULCER, WITH FAT LAYER EXPOSED (HCC): ICD-10-CM

## 2020-07-25 PROCEDURE — 74011000250 HC RX REV CODE- 250: Performed by: INTERNAL MEDICINE

## 2020-07-25 PROCEDURE — 74011250636 HC RX REV CODE- 250/636: Performed by: INTERNAL MEDICINE

## 2020-07-25 PROCEDURE — 96374 THER/PROPH/DIAG INJ IV PUSH: CPT

## 2020-07-25 RX ORDER — SODIUM CHLORIDE 0.9 % (FLUSH) 0.9 %
10-40 SYRINGE (ML) INJECTION EVERY 8 HOURS
Status: DISCONTINUED | OUTPATIENT
Start: 2020-07-25 | End: 2020-07-29 | Stop reason: HOSPADM

## 2020-07-25 RX ORDER — HEPARIN 100 UNIT/ML
500 SYRINGE INTRAVENOUS ONCE
Status: COMPLETED | OUTPATIENT
Start: 2020-07-25 | End: 2020-07-25

## 2020-07-25 RX ADMIN — HEPARIN 500 UNITS: 100 SYRINGE at 09:21

## 2020-07-25 RX ADMIN — Medication 20 ML: at 09:20

## 2020-07-25 RX ADMIN — DAPTOMYCIN 600 MG: 500 INJECTION, POWDER, LYOPHILIZED, FOR SOLUTION INTRAVENOUS at 09:17

## 2020-07-26 ENCOUNTER — HOSPITAL ENCOUNTER (OUTPATIENT)
Dept: INFUSION THERAPY | Age: 75
Discharge: HOME | End: 2020-07-26

## 2020-07-26 ENCOUNTER — HOSPITAL ENCOUNTER (EMERGENCY)
Age: 75
Discharge: HOME | End: 2020-07-26
Attending: EMERGENCY MEDICINE
Payer: MEDICARE

## 2020-07-26 VITALS
HEART RATE: 87 BPM | HEIGHT: 66 IN | OXYGEN SATURATION: 100 % | DIASTOLIC BLOOD PRESSURE: 52 MMHG | BODY MASS INDEX: 38.89 KG/M2 | TEMPERATURE: 98.2 F | WEIGHT: 242 LBS | SYSTOLIC BLOOD PRESSURE: 153 MMHG | RESPIRATION RATE: 20 BRPM

## 2020-07-26 DIAGNOSIS — L97.512 RIGHT FOOT ULCER, WITH FAT LAYER EXPOSED (HCC): ICD-10-CM

## 2020-07-26 DIAGNOSIS — L03.115 CELLULITIS OF RIGHT FOOT: ICD-10-CM

## 2020-07-26 DIAGNOSIS — L97.514 RIGHT FOOT ULCER, WITH NECROSIS OF BONE (HCC): ICD-10-CM

## 2020-07-26 DIAGNOSIS — T82.838A BLEEDING FROM PERIPHERALLY INSERTED CENTRAL VENOUS CATHETER (PICC), INITIAL ENCOUNTER: Primary | ICD-10-CM

## 2020-07-26 PROCEDURE — 99282 EMERGENCY DEPT VISIT SF MDM: CPT

## 2020-07-26 NOTE — PROGRESS NOTES
[facility] Progress Note     Date: 2020     Name: Evalene Bamberger     MRN: [id number]                                                             : 1945     Daptomycin IVP     Mr. Bustamante arrived ambulatory with rolling walker at 0845. Pt stated \"I have a situation\". Patient's PICC line was wrapped with gauze and tape due to bleeding. Patient states he was scratching and some how pulled his PICC line during the night around midnight. When patient arrived with the dressing he and his wife applied, was soaked with blood.     Mr. Bustamante's was brought to the the back room where PICC line was assessed further; PICC appeared to be coming out and unable to remove any more of the dressing without dislodging line further and continued bleeding. Right upper arm double lumen PICC was covered with pressure dressing and patient sent to the ED accompanied by his wife. ED called prior to inform of patient arrival. Dr. Borden Pap office was contacted to make them aware of the situation and that today's dose of Daptomicin was not administered.        Mr. Bustamante was discharged from [place] [address] in stable condition 0900.  He was sent to [facility] ED for PICC line assessment.     Eden Pritchard RN   2020

## 2020-07-26 NOTE — ED PROVIDER NOTES
HPI   Patient recently had a PICC line inserted in his right arm for long-term antibiotic administration. He went over to the infusion center today to be seeing his antibiotics and they noticed that there was some bleeding around the insertion site of the PICC line. Patient says he accidentally pulled on the PICC line last night while he was sleeping. Infusion center did not give him his antibiotics because they were concerned about the PICC line bleeding and they sent him to our department for evaluation. Patient denies any pain or other symptoms. No other complaints given at this time  Past Medical History:   Diagnosis Date    [facility]     Asthma     CAD (coronary artery disease)     Chronic [facility] pulmonary disease ([facility]     Diabetes ([facility]     Hypertension     Sleep apnea        Past Surgical History:   Procedure Laterality Date    HX CARPAL TUNNEL RELEASE Bilateral     HX PACEMAKER      HX ROTATOR CUFF REPAIR Bilateral          History reviewed. No pertinent family history. Social History     Socioeconomic History    Marital status:      Spouse name: Not on file    Number of children: Not on file    Years of education: Not on file    Highest education level: Not on file   Occupational History    Not on file   Social Needs    Financial resource strain: Not on file    Food insecurity     Worry: Not on file     Inability: Not on file    Transportation needs     Medical: Not on file     Non-medical: Not on file   Tobacco Use    Smoking status: Former Smoker     Last attempt to quit: 1971     Years since quittin.1    Smokeless tobacco: Never Used   Substance and Sexual Activity    Alcohol use: Yes     Comment: occas.     Drug use: Never    Sexual activity: Not on file   Lifestyle    Physical activity     Days per week: Not on file     Minutes per session: Not on file    Stress: Not on file   Relationships    Social connections     Talks on phone: Not on file Gets together: Not on file     Attends Confucianist service: Not on file     Active member of club or organization: Not on file     Attends meetings of clubs or organizations: Not on file     Relationship status: Not on file    Intimate partner violence     Fear of current or ex partner: Not on file     Emotionally abused: Not on file     Physically abused: Not on file     Forced sexual activity: Not on file   Other Topics Concern    Not on file   Social History Narrative    Not on file         ALLERGIES: Bee sting [sting, bee]; Contrast agent [iodine]; and Doxycycline    Review of Systems   Constitutional: Negative. HENT: Negative. Respiratory: Negative. Cardiovascular: Negative. Gastrointestinal: Negative. Musculoskeletal: Negative. Skin: Negative. Psychiatric/Behavioral: Negative. Vitals:    07/26/20 0938   BP: 153/52   Pulse: 87   Resp: 20   Temp: 98.2 °F (36.8 °C)   SpO2: 100%   Weight: 109.8 kg (242 lb)   Height: 5' 6\" (1.676 m)            Physical Exam  Vitals signs and nursing note reviewed. Constitutional:       Appearance: He is well-developed. HENT:      Head: Normocephalic and atraumatic. Cardiovascular:      Rate and Rhythm: Normal rate and regular rhythm. Pulmonary:      Effort: Pulmonary effort is normal.      Breath sounds: Normal breath sounds. Musculoskeletal: Normal range of motion. Comments: PICC line site in the right upper arm has a pressure dressing on it when the patient first came to our department. I removed pressure dressing and examined the PICC line site and it the present time hemostasis has occurred. No evidence of infection or inflammation around the PICC line site   Skin:     General: Skin is warm and dry. Neurological:      Mental Status: He is alert and oriented to person, place, and time. MDM       Procedures    While here in our department, we cleaned and dressed the patient's PICC line and hemostasis remained.   He will be discharged home to continue his follow-up with the infusion center for his antibiotics. Patient is satisfied with his care and understands and agrees with the disposition and follow-up plan.   Brii Scruggs MD 10:50 AM

## 2020-07-27 ENCOUNTER — HOSPITAL ENCOUNTER (OUTPATIENT)
Dept: INFUSION THERAPY | Age: 75
Discharge: HOME | End: 2020-07-27
Payer: MEDICARE

## 2020-07-27 VITALS
OXYGEN SATURATION: 98 % | HEART RATE: 80 BPM | DIASTOLIC BLOOD PRESSURE: 64 MMHG | SYSTOLIC BLOOD PRESSURE: 115 MMHG | TEMPERATURE: 98.1 F | RESPIRATION RATE: 18 BRPM

## 2020-07-27 DIAGNOSIS — L03.115 CELLULITIS OF RIGHT FOOT: ICD-10-CM

## 2020-07-27 DIAGNOSIS — L97.512 RIGHT FOOT ULCER, WITH FAT LAYER EXPOSED (HCC): ICD-10-CM

## 2020-07-27 DIAGNOSIS — L97.514 RIGHT FOOT ULCER, WITH NECROSIS OF BONE (HCC): Primary | ICD-10-CM

## 2020-07-27 DIAGNOSIS — L97.514 RIGHT FOOT ULCER, WITH NECROSIS OF BONE (HCC): ICD-10-CM

## 2020-07-27 LAB
ANION GAP SERPL CALC-SCNC: 5 MMOL/L (ref 3–18)
BASO+EOS+MONOS # BLD AUTO: 0.7 K/UL (ref 0–2.3)
BASO+EOS+MONOS NFR BLD AUTO: 15 % (ref 0.1–17)
BUN SERPL-MCNC: 48 MG/DL (ref 7–18)
BUN/CREAT SERPL: 27 (ref 12–20)
CALCIUM SERPL-MCNC: 8 MG/DL (ref 8.5–10.1)
CHLORIDE SERPL-SCNC: 105 MMOL/L (ref 100–111)
CO2 SERPL-SCNC: 31 MMOL/L (ref 21–32)
CREAT SERPL-MCNC: 1.75 MG/DL (ref 0.6–1.3)
DIFFERENTIAL METHOD BLD: ABNORMAL
ERYTHROCYTE [DISTWIDTH] IN BLOOD BY AUTOMATED COUNT: 13.7 % (ref 11.5–14.5)
ERYTHROCYTE [SEDIMENTATION RATE] IN BLOOD: >140 MM/HR (ref 0–20)
GLUCOSE SERPL-MCNC: 152 MG/DL (ref 74–99)
HCT VFR BLD AUTO: 22 % (ref 36–48)
HGB BLD-MCNC: 7.4 G/DL (ref 12–16)
LYMPHOCYTES # BLD: 1.1 K/UL (ref 1.1–5.9)
LYMPHOCYTES NFR BLD: 24 % (ref 14–44)
MCH RBC QN AUTO: 30.3 PG (ref 25–35)
MCHC RBC AUTO-ENTMCNC: 33.6 G/DL (ref 31–37)
MCV RBC AUTO: 90.2 FL (ref 78–102)
NEUTS SEG # BLD: 2.7 K/UL (ref 1.8–9.5)
NEUTS SEG NFR BLD: 61 % (ref 40–70)
PLATELET # BLD AUTO: 38 K/UL (ref 140–440)
POTASSIUM SERPL-SCNC: 4.1 MMOL/L (ref 3.5–5.5)
RBC # BLD AUTO: 2.44 M/UL (ref 4.1–5.1)
SODIUM SERPL-SCNC: 141 MMOL/L (ref 136–145)
WBC # BLD AUTO: 4.5 K/UL (ref 4.5–13)

## 2020-07-27 PROCEDURE — 85025 COMPLETE CBC W/AUTO DIFF WBC: CPT

## 2020-07-27 PROCEDURE — 36592 COLLECT BLOOD FROM PICC: CPT

## 2020-07-27 PROCEDURE — 74011000250 HC RX REV CODE- 250: Performed by: INTERNAL MEDICINE

## 2020-07-27 PROCEDURE — 80048 BASIC METABOLIC PNL TOTAL CA: CPT

## 2020-07-27 PROCEDURE — 85652 RBC SED RATE AUTOMATED: CPT

## 2020-07-27 PROCEDURE — 96374 THER/PROPH/DIAG INJ IV PUSH: CPT

## 2020-07-27 PROCEDURE — 74011250636 HC RX REV CODE- 250/636: Performed by: INTERNAL MEDICINE

## 2020-07-27 PROCEDURE — 77030020847 HC STATLOK BARD -A

## 2020-07-27 RX ORDER — ACETAMINOPHEN 325 MG/1
650 TABLET ORAL AS NEEDED
Status: CANCELLED
Start: 2020-07-28

## 2020-07-27 RX ORDER — ALBUTEROL SULFATE 0.83 MG/ML
2.5 SOLUTION RESPIRATORY (INHALATION) AS NEEDED
Status: CANCELLED
Start: 2020-07-28

## 2020-07-27 RX ORDER — SODIUM CHLORIDE 9 MG/ML
10 INJECTION INTRAMUSCULAR; INTRAVENOUS; SUBCUTANEOUS AS NEEDED
Status: CANCELLED | OUTPATIENT
Start: 2020-07-28

## 2020-07-27 RX ORDER — SODIUM CHLORIDE 0.9 % (FLUSH) 0.9 %
5-10 SYRINGE (ML) INJECTION AS NEEDED
Status: CANCELLED | OUTPATIENT
Start: 2020-07-28

## 2020-07-27 RX ORDER — SODIUM CHLORIDE 0.9 % (FLUSH) 0.9 %
10-40 SYRINGE (ML) INJECTION ONCE
Status: COMPLETED | OUTPATIENT
Start: 2020-07-27 | End: 2020-07-27

## 2020-07-27 RX ORDER — EPINEPHRINE 1 MG/ML
0.3 INJECTION, SOLUTION, CONCENTRATE INTRAVENOUS AS NEEDED
Status: CANCELLED | OUTPATIENT
Start: 2020-07-28

## 2020-07-27 RX ORDER — DIPHENHYDRAMINE HYDROCHLORIDE 50 MG/ML
50 INJECTION, SOLUTION INTRAMUSCULAR; INTRAVENOUS AS NEEDED
Status: CANCELLED
Start: 2020-07-28

## 2020-07-27 RX ORDER — ONDANSETRON 2 MG/ML
8 INJECTION INTRAMUSCULAR; INTRAVENOUS AS NEEDED
Status: CANCELLED | OUTPATIENT
Start: 2020-07-28

## 2020-07-27 RX ORDER — HEPARIN 100 UNIT/ML
500 SYRINGE INTRAVENOUS AS NEEDED
Status: CANCELLED | OUTPATIENT
Start: 2020-07-28

## 2020-07-27 RX ORDER — DIPHENHYDRAMINE HYDROCHLORIDE 50 MG/ML
25 INJECTION, SOLUTION INTRAMUSCULAR; INTRAVENOUS AS NEEDED
Status: CANCELLED
Start: 2020-07-28

## 2020-07-27 RX ORDER — HYDROCORTISONE SODIUM SUCCINATE 100 MG/2ML
100 INJECTION, POWDER, FOR SOLUTION INTRAMUSCULAR; INTRAVENOUS AS NEEDED
Status: CANCELLED | OUTPATIENT
Start: 2020-07-28

## 2020-07-27 RX ADMIN — Medication 10 ML: at 12:04

## 2020-07-27 RX ADMIN — DAPTOMYCIN 600 MG: 500 INJECTION, POWDER, LYOPHILIZED, FOR SOLUTION INTRAVENOUS at 12:02

## 2020-07-27 NOTE — PROGRESS NOTES
[facility] Progress Note    Date: 2020    Name: Evalene Bamberger    MRN: [id number]         : 1945    Daptomycin IVP/ PICC Care/ Weekly Labs    Mr. Katlin Gutierrez was assessed and education was provided. Mr. Nestor Merlos vitals were reviewed. Visit Vitals  /64 (BP 1 Location: Left arm, BP Patient Position: Sitting)   Pulse 80   Temp 98.1 °F (36.7 °C)   Resp 18   SpO2 98%     Recent Results (from the past 12 hour(s))   METABOLIC PANEL, BASIC    Collection Time: 20 10:54 AM   Result Value Ref Range    Sodium 141 136 - 145 mmol/L    Potassium 4.1 3.5 - 5.5 mmol/L    Chloride 105 100 - 111 mmol/L    CO2 31 21 - 32 mmol/L    Anion gap 5 3.0 - 18 mmol/L    Glucose 152 (H) 74 - 99 mg/dL    BUN 48 (H) 7.0 - 18 MG/DL    Creatinine 1.75 (H) 0.6 - 1.3 MG/DL    BUN/Creatinine ratio 27 (H) 12 - 20      GFR est AA 46 (L) >60 ml/min/1.73m2    GFR est non-AA 38 (L) >60 ml/min/1.73m2    Calcium 8.0 (L) 8.5 - 10.1 MG/DL   CBC WITH 3 PART DIFF    Collection Time: 20 10:59 AM   Result Value Ref Range    WBC 4.5 4.5 - 13.0 K/uL    RBC 2.44 (L) 4.10 - 5.10 M/uL    HGB 7.4 (L) 12.0 - 16.0 g/dL    HCT 22.0 (L) 36 - 48 %    MCV 90.2 78 - 102 FL    MCH 30.3 25.0 - 35.0 PG    MCHC 33.6 31 - 37 g/dL    RDW 13.7 11.5 - 14.5 %    PLATELET 38 (L) 501 - 440 K/uL    NEUTROPHILS 61 40 - 70 %    MIXED CELLS 15 0.1 - 17 %    LYMPHOCYTES 24 14 - 44 %    ABS. NEUTROPHILS 2.7 1.8 - 9.5 K/UL    ABS. MIXED CELLS 0.7 0.0 - 2.3 K/uL    ABS. LYMPHOCYTES 1.1 1.1 - 5.9 K/UL    DF AUTOMATED       Patient arrived with ace bandage, gauze karma, and gauze squares over PICC insertion site. Bandages and gauze removed. Site cleaned under sterile technique. External tip of catheter measured approximately 7cm from insertion site to statloc site. New dressing applied under sterile technique. Patient tolerated well. New enclave applied. New stockinette supplied to patient. Labs drawn (Sed Rate; CBC; CMP) per written order and sent to lab. []  Vancomycin     []  Invanz     [x]  Cubicin 600mg     []  Rocephin    was pushed over 2 minutes as ordered. Pt with right upper arm single lumen PICC line. Lumen flushes without difficulty and positive for blood return. Lumen flushed with 20mL NS.    Mr. Mariposa Cody tolerated infusion, and had no complaints at this time. Patient armband removed and shredded. Mr. Mariposa Cody was discharged from [place] [address] in stable condition at 1205. He is 7/28/2020 at 1000 for his next appointment.     Norma Rod RN  July 27, 2020  1205

## 2020-07-28 ENCOUNTER — HOSPITAL ENCOUNTER (OUTPATIENT)
Dept: INFUSION THERAPY | Age: 75
Discharge: HOME | End: 2020-07-28
Payer: MEDICARE

## 2020-07-28 VITALS
HEART RATE: 83 BPM | TEMPERATURE: 98.9 F | DIASTOLIC BLOOD PRESSURE: 68 MMHG | SYSTOLIC BLOOD PRESSURE: 132 MMHG | RESPIRATION RATE: 18 BRPM

## 2020-07-28 DIAGNOSIS — L97.514 RIGHT FOOT ULCER, WITH NECROSIS OF BONE (HCC): ICD-10-CM

## 2020-07-28 DIAGNOSIS — L97.512 RIGHT FOOT ULCER, WITH FAT LAYER EXPOSED (HCC): ICD-10-CM

## 2020-07-28 DIAGNOSIS — L03.115 CELLULITIS OF RIGHT FOOT: ICD-10-CM

## 2020-07-28 DIAGNOSIS — L97.514 RIGHT FOOT ULCER, WITH NECROSIS OF BONE (HCC): Primary | ICD-10-CM

## 2020-07-28 LAB
CK SERPL-CCNC: 43 U/L (ref 39–308)
CRP SERPL-MCNC: 1.3 MG/DL (ref 0–0.3)

## 2020-07-28 PROCEDURE — 74011000250 HC RX REV CODE- 250: Performed by: INTERNAL MEDICINE

## 2020-07-28 PROCEDURE — 86140 C-REACTIVE PROTEIN: CPT

## 2020-07-28 PROCEDURE — 36592 COLLECT BLOOD FROM PICC: CPT

## 2020-07-28 PROCEDURE — 82550 ASSAY OF CK (CPK): CPT

## 2020-07-28 PROCEDURE — 77030020847 HC STATLOK BARD -A

## 2020-07-28 PROCEDURE — 96374 THER/PROPH/DIAG INJ IV PUSH: CPT

## 2020-07-28 PROCEDURE — 74011250636 HC RX REV CODE- 250/636: Performed by: INTERNAL MEDICINE

## 2020-07-28 RX ORDER — ACETAMINOPHEN 325 MG/1
650 TABLET ORAL AS NEEDED
Status: CANCELLED
Start: 2020-07-29

## 2020-07-28 RX ORDER — SODIUM CHLORIDE 9 MG/ML
10 INJECTION INTRAMUSCULAR; INTRAVENOUS; SUBCUTANEOUS AS NEEDED
Status: CANCELLED | OUTPATIENT
Start: 2020-07-29

## 2020-07-28 RX ORDER — HYDROCORTISONE SODIUM SUCCINATE 100 MG/2ML
100 INJECTION, POWDER, FOR SOLUTION INTRAMUSCULAR; INTRAVENOUS AS NEEDED
Status: CANCELLED | OUTPATIENT
Start: 2020-07-29

## 2020-07-28 RX ORDER — EPINEPHRINE 1 MG/ML
0.3 INJECTION, SOLUTION, CONCENTRATE INTRAVENOUS AS NEEDED
Status: CANCELLED | OUTPATIENT
Start: 2020-07-29

## 2020-07-28 RX ORDER — DIPHENHYDRAMINE HYDROCHLORIDE 50 MG/ML
25 INJECTION, SOLUTION INTRAMUSCULAR; INTRAVENOUS AS NEEDED
Status: CANCELLED
Start: 2020-07-29

## 2020-07-28 RX ORDER — ONDANSETRON 2 MG/ML
8 INJECTION INTRAMUSCULAR; INTRAVENOUS AS NEEDED
Status: CANCELLED | OUTPATIENT
Start: 2020-07-29

## 2020-07-28 RX ORDER — SODIUM CHLORIDE 0.9 % (FLUSH) 0.9 %
5-10 SYRINGE (ML) INJECTION AS NEEDED
Status: CANCELLED | OUTPATIENT
Start: 2020-07-29

## 2020-07-28 RX ORDER — ALBUTEROL SULFATE 0.83 MG/ML
2.5 SOLUTION RESPIRATORY (INHALATION) AS NEEDED
Status: CANCELLED
Start: 2020-07-29

## 2020-07-28 RX ORDER — DIPHENHYDRAMINE HYDROCHLORIDE 50 MG/ML
50 INJECTION, SOLUTION INTRAMUSCULAR; INTRAVENOUS AS NEEDED
Status: CANCELLED
Start: 2020-07-29

## 2020-07-28 RX ORDER — HEPARIN 100 UNIT/ML
500 SYRINGE INTRAVENOUS AS NEEDED
Status: CANCELLED | OUTPATIENT
Start: 2020-07-29

## 2020-07-28 RX ORDER — SODIUM CHLORIDE 0.9 % (FLUSH) 0.9 %
10-40 SYRINGE (ML) INJECTION ONCE
Status: COMPLETED | OUTPATIENT
Start: 2020-07-28 | End: 2020-07-28

## 2020-07-28 RX ADMIN — DAPTOMYCIN 600 MG: 500 INJECTION, POWDER, LYOPHILIZED, FOR SOLUTION INTRAVENOUS at 10:22

## 2020-07-28 RX ADMIN — Medication 10 ML: at 10:22

## 2020-07-28 NOTE — PROGRESS NOTES
[facility] Progress Note    Date: 2020    Name: Moe Roldan    MRN: [id number]         : 1945    Daptomycin IVP/ PICC Care/ Weekly Labs    Mr. Randy Angel was assessed and education was provided. Mr. Eddie Mendoza vitals were reviewed. Visit Vitals  /68 (BP 1 Location: Left arm, BP Patient Position: Sitting)   Pulse 83   Temp 98.9 °F (37.2 °C)   Resp 18     Recent Results (from the past 12 hour(s))   CK    Collection Time: 20 10:51 AM   Result Value Ref Range    CK 43 39 - 308 U/L     Patient arrived with a visibly bloody dressing. Patient stated the dressing began to ooze blood from under dressing the night before around 2200. Patient also stated he had taken his Xarelto 20mg PO at approximately 1900. Blood stained net dressing wrapped around PICC insertion site with additional net dressing over the first net dressing and PICC. Net dressings and stained dressing removed. Site cleaned under sterile technique. External tip of catheter measured approximately 7cm from insertion site to statloc site. The PICC line had not come out any further. New dressing applied which included gauze to add pressure under sterile technique. Patient tolerated well. New enclave applied. New stockinette supplied to patient. Labs drawn (CK) per written order and sent to lab. []  Vancomycin     []  Invanz     [x]  Cubicin 600mg     []  Rocephin    was pushed over 2 minutes as ordered. Pt with right upper arm single lumen PICC line. Lumen flushes without difficulty and positive for blood return. Lumen flushed with 20mL NS.    Mr. Randy Angel tolerated infusion, and had no complaints at this time. Patient armband removed and shredded. Mr. Randy Angel was discharged from [place] [address] in stable condition at 1055. He is 2020 at 1000 for his next appointment.     Gama Knight RN  2020  1055

## 2020-07-29 ENCOUNTER — HOSPITAL ENCOUNTER (OUTPATIENT)
Dept: INFUSION THERAPY | Age: 75
Discharge: HOME | End: 2020-07-29
Payer: MEDICARE

## 2020-07-29 ENCOUNTER — HOSPITAL ENCOUNTER (EMERGENCY)
Age: 75
Discharge: HOME | End: 2020-07-29
Attending: EMERGENCY MEDICINE
Payer: MEDICARE

## 2020-07-29 VITALS
RESPIRATION RATE: 18 BRPM | OXYGEN SATURATION: 100 % | SYSTOLIC BLOOD PRESSURE: 142 MMHG | HEIGHT: 66 IN | DIASTOLIC BLOOD PRESSURE: 62 MMHG | TEMPERATURE: 98 F | HEART RATE: 81 BPM | BODY MASS INDEX: 35.36 KG/M2 | WEIGHT: 220 LBS

## 2020-07-29 VITALS
DIASTOLIC BLOOD PRESSURE: 69 MMHG | RESPIRATION RATE: 18 BRPM | TEMPERATURE: 98.4 F | SYSTOLIC BLOOD PRESSURE: 134 MMHG | HEART RATE: 84 BPM | OXYGEN SATURATION: 97 %

## 2020-07-29 DIAGNOSIS — T82.838D: Primary | ICD-10-CM

## 2020-07-29 DIAGNOSIS — L97.514 RIGHT FOOT ULCER, WITH NECROSIS OF BONE (HCC): Primary | ICD-10-CM

## 2020-07-29 DIAGNOSIS — L97.512 RIGHT FOOT ULCER, WITH FAT LAYER EXPOSED (HCC): ICD-10-CM

## 2020-07-29 DIAGNOSIS — L97.514 RIGHT FOOT ULCER, WITH NECROSIS OF BONE (HCC): ICD-10-CM

## 2020-07-29 DIAGNOSIS — L03.115 CELLULITIS OF RIGHT FOOT: ICD-10-CM

## 2020-07-29 PROCEDURE — 74011250636 HC RX REV CODE- 250/636: Performed by: INTERNAL MEDICINE

## 2020-07-29 PROCEDURE — 74011000250 HC RX REV CODE- 250: Performed by: INTERNAL MEDICINE

## 2020-07-29 PROCEDURE — 99282 EMERGENCY DEPT VISIT SF MDM: CPT

## 2020-07-29 PROCEDURE — 96374 THER/PROPH/DIAG INJ IV PUSH: CPT

## 2020-07-29 RX ORDER — SODIUM CHLORIDE 0.9 % (FLUSH) 0.9 %
5-10 SYRINGE (ML) INJECTION AS NEEDED
Status: CANCELLED | OUTPATIENT
Start: 2020-07-30

## 2020-07-29 RX ORDER — DIPHENHYDRAMINE HYDROCHLORIDE 50 MG/ML
50 INJECTION, SOLUTION INTRAMUSCULAR; INTRAVENOUS AS NEEDED
Status: CANCELLED
Start: 2020-07-30

## 2020-07-29 RX ORDER — SODIUM CHLORIDE 9 MG/ML
10 INJECTION INTRAMUSCULAR; INTRAVENOUS; SUBCUTANEOUS AS NEEDED
Status: CANCELLED | OUTPATIENT
Start: 2020-07-30

## 2020-07-29 RX ORDER — SODIUM CHLORIDE 0.9 % (FLUSH) 0.9 %
5-10 SYRINGE (ML) INJECTION AS NEEDED
Status: DISPENSED | OUTPATIENT
Start: 2020-07-29 | End: 2020-07-29

## 2020-07-29 RX ORDER — ACETAMINOPHEN 325 MG/1
650 TABLET ORAL AS NEEDED
Status: CANCELLED
Start: 2020-07-30

## 2020-07-29 RX ORDER — DIPHENHYDRAMINE HYDROCHLORIDE 50 MG/ML
25 INJECTION, SOLUTION INTRAMUSCULAR; INTRAVENOUS AS NEEDED
Status: CANCELLED
Start: 2020-07-30

## 2020-07-29 RX ORDER — ONDANSETRON 2 MG/ML
8 INJECTION INTRAMUSCULAR; INTRAVENOUS AS NEEDED
Status: CANCELLED | OUTPATIENT
Start: 2020-07-30

## 2020-07-29 RX ORDER — ALBUTEROL SULFATE 0.83 MG/ML
2.5 SOLUTION RESPIRATORY (INHALATION) AS NEEDED
Status: CANCELLED
Start: 2020-07-30

## 2020-07-29 RX ORDER — EPINEPHRINE 1 MG/ML
0.3 INJECTION, SOLUTION, CONCENTRATE INTRAVENOUS AS NEEDED
Status: CANCELLED | OUTPATIENT
Start: 2020-07-30

## 2020-07-29 RX ORDER — HYDROCORTISONE SODIUM SUCCINATE 100 MG/2ML
100 INJECTION, POWDER, FOR SOLUTION INTRAMUSCULAR; INTRAVENOUS AS NEEDED
Status: CANCELLED | OUTPATIENT
Start: 2020-07-30

## 2020-07-29 RX ORDER — HEPARIN 100 UNIT/ML
500 SYRINGE INTRAVENOUS AS NEEDED
Status: CANCELLED | OUTPATIENT
Start: 2020-07-30

## 2020-07-29 RX ADMIN — DAPTOMYCIN 600 MG: 500 INJECTION, POWDER, LYOPHILIZED, FOR SOLUTION INTRAVENOUS at 10:00

## 2020-07-29 NOTE — DISCHARGE INSTRUCTIONS
Apply pressure to bleeding site for at least 15 minutes. Elevate right arm above level of heart. Call 911 if unable to control bleeding, right hand/finger pallor/discoloration/numbness/tingling.

## 2020-07-29 NOTE — ED NOTES
Patient stated understanding of discharge instructions. Patient was ambulatory upon discharge. Patient received no prescriptions.

## 2020-07-29 NOTE — ED PROVIDER NOTES
HPI   Patient is a 76 y.o male currently undergoing outpatient antibiotic infusion for right foot ulcer, on Xarelto for A-Fib who presents from infusion center due to recurrent bleeding from right upper arm PICC line site. Pt was sent over after bleeding was unable to be controlled. Pt states the bleeding has since stopped. He denies any other complaints at this time. His last dose of Xarelto was at 1900 last night as directed. He states he saw his PCM yesterday who recommended continuation of Xarelto despite the recent bleeding episode from PICC line this past week. Past Medical History:   Diagnosis Date    [facility]     Asthma     CAD (coronary artery disease)     Chronic obstructive pulmonary disease ([facility]     Diabetes ([facility]     Hypertension     Sleep apnea        Past Surgical History:   Procedure Laterality Date    HX CARPAL TUNNEL RELEASE Bilateral     HX PACEMAKER      HX ROTATOR CUFF REPAIR Bilateral          History reviewed. No pertinent family history. Social History     Socioeconomic History    Marital status:      Spouse name: Not on file    Number of children: Not on file    Years of education: Not on file    Highest education level: Not on file   Occupational History    Not on file   Social Needs    Financial resource strain: Not on file    Food insecurity     Worry: Not on file     Inability: Not on file    Transportation needs     Medical: Not on file     Non-medical: Not on file   Tobacco Use    Smoking status: Former Smoker     Last attempt to quit: 1971     Years since quittin.1    Smokeless tobacco: Never Used   Substance and Sexual Activity    Alcohol use: Yes     Comment: occas.     Drug use: Never    Sexual activity: Not on file   Lifestyle    Physical activity     Days per week: Not on file     Minutes per session: Not on file    Stress: Not on file   Relationships    Social connections     Talks on phone: Not on file     Gets together: Not on file     Attends Religion service: Not on file     Active member of club or organization: Not on file     Attends meetings of clubs or organizations: Not on file     Relationship status: Not on file    Intimate partner violence     Fear of current or ex partner: Not on file     Emotionally abused: Not on file     Physically abused: Not on file     Forced sexual activity: Not on file   Other Topics Concern    Not on file   Social History Narrative    Not on file         ALLERGIES: Bee sting [sting, bee]; Contrast agent [iodine]; and Doxycycline    Review of Systems   Constitutional: Negative for fatigue. Respiratory: Negative for cough and shortness of breath. Cardiovascular: Negative for chest pain. Musculoskeletal:        No right arm pain   Skin: Negative for color change and pallor. Right upper PICC line with bleeding   Neurological: Negative for syncope. Psychiatric/Behavioral: Negative for confusion. Vitals:    07/29/20 1027   BP: 142/62   Pulse: 81   Resp: 18   Temp: 98 °F (36.7 °C)   SpO2: 100%            Physical Exam  Vitals signs and nursing note reviewed. Constitutional:       Appearance: Normal appearance. HENT:      Head: Normocephalic. Mouth/Throat:      Mouth: Mucous membranes are moist.   Cardiovascular:      Rate and Rhythm: Normal rate and regular rhythm. Pulses: Normal pulses. Heart sounds: Normal heart sounds. Pulmonary:      Effort: Pulmonary effort is normal.   Musculoskeletal:         General: No swelling. Comments: Right upper arm PICC with gauze partially soaked in blood. No active hemorrhage from insertion site. Skin:     General: Skin is warm and dry. Capillary Refill: Capillary refill takes less than 2 seconds. Neurological:      Mental Status: He is alert and oriented to person, place, and time.           MDM      Pt is a 76 y.o. male who presents after recurrent episode of bleeding from his right upper extremity PICC line site. On my assessment, there is no active hemorrhage and distal right upper extremity neurovascular exam appears normal. Given recent visit with PCM regarding continuation of Xarelto use despite bleeding episodes and control of bleeding currently, I felt the pt was appropriate for discharge home with plan to continue outpatient antibiotic infusion as scheduled. I counseled him to contact his PCM to schedule as close follow-up and provided him with hemostasis instructions in case of rebleeding. Pt felt comfortable with this plan.      Jake Lee, DO  Emergency Physician      Procedures

## 2020-07-29 NOTE — PROGRESS NOTES
\[facility] [place] [facility]\"" Progress Note    Date: 2020    Name: Moe Roldan    MRN: [id number]         : 1945    Daptomycin IVP    Mr. Randy Angel was assessed and education was provided. Mr. Eddie Mendoza vitals were reviewed. Visit Vitals  /69   Pulse 84   Temp 98.4 °F (36.9 °C)   Resp 18   SpO2 97%       Patient arrived with a visibly bloody dressing. Patient stated the dressing began to ooze blood from under dressing the night before around 2100. He put ice around the around to help stop the bleeding. Patient also stated he had taken his Xarelto 20mg PO at approximately 1900. Instructed to go the nearest ER. The bleeding have been an ongoing issue since . Patient went to ER on  but nothing was done per patient. The PICC issue needs to be addressed to find the cause of the bleeding around the PICC site.         []  Vancomycin     []  Invanz     [x]  Cubicin 600mg     []  Rocephin    was pushed over 2 minutes as ordered. Pt with right upper arm single lumen PICC line. Lumen flushes without difficulty and positive for blood return. Lumen flushed with 20mL NS.    Mr. Randy Angel tolerated infusion, and had no complaints at this time. Patient armband removed and shredded. Mr. Randy Angel was discharged from [place] [address] in stable condition at 1005. He is 2020 at 0830 for his next appointment.     Aurora Arboleda  2020  1055

## 2020-07-30 ENCOUNTER — HOSPITAL ENCOUNTER (OUTPATIENT)
Dept: INFUSION THERAPY | Age: 75
Discharge: HOME | End: 2020-07-30
Payer: MEDICARE

## 2020-07-30 VITALS
DIASTOLIC BLOOD PRESSURE: 60 MMHG | SYSTOLIC BLOOD PRESSURE: 139 MMHG | RESPIRATION RATE: 18 BRPM | OXYGEN SATURATION: 100 % | HEART RATE: 89 BPM | TEMPERATURE: 97.9 F

## 2020-07-30 DIAGNOSIS — L97.514 RIGHT FOOT ULCER, WITH NECROSIS OF BONE (HCC): Primary | ICD-10-CM

## 2020-07-30 DIAGNOSIS — L97.512 RIGHT FOOT ULCER, WITH FAT LAYER EXPOSED (HCC): ICD-10-CM

## 2020-07-30 DIAGNOSIS — L03.115 CELLULITIS OF RIGHT FOOT: ICD-10-CM

## 2020-07-30 DIAGNOSIS — L97.514 RIGHT FOOT ULCER, WITH NECROSIS OF BONE (HCC): ICD-10-CM

## 2020-07-30 PROCEDURE — 74011000250 HC RX REV CODE- 250: Performed by: INTERNAL MEDICINE

## 2020-07-30 PROCEDURE — 74011250636 HC RX REV CODE- 250/636: Performed by: INTERNAL MEDICINE

## 2020-07-30 PROCEDURE — 96374 THER/PROPH/DIAG INJ IV PUSH: CPT

## 2020-07-30 RX ORDER — EPINEPHRINE 1 MG/ML
0.3 INJECTION, SOLUTION, CONCENTRATE INTRAVENOUS AS NEEDED
Status: CANCELLED | OUTPATIENT
Start: 2020-07-31

## 2020-07-30 RX ORDER — SODIUM CHLORIDE 0.9 % (FLUSH) 0.9 %
5-10 SYRINGE (ML) INJECTION AS NEEDED
Status: CANCELLED | OUTPATIENT
Start: 2020-07-31

## 2020-07-30 RX ORDER — HYDROCORTISONE SODIUM SUCCINATE 100 MG/2ML
100 INJECTION, POWDER, FOR SOLUTION INTRAMUSCULAR; INTRAVENOUS AS NEEDED
Status: CANCELLED | OUTPATIENT
Start: 2020-07-31

## 2020-07-30 RX ORDER — ALBUTEROL SULFATE 0.83 MG/ML
2.5 SOLUTION RESPIRATORY (INHALATION) AS NEEDED
Status: CANCELLED
Start: 2020-07-31

## 2020-07-30 RX ORDER — ONDANSETRON 2 MG/ML
8 INJECTION INTRAMUSCULAR; INTRAVENOUS AS NEEDED
Status: CANCELLED | OUTPATIENT
Start: 2020-07-31

## 2020-07-30 RX ORDER — DIPHENHYDRAMINE HYDROCHLORIDE 50 MG/ML
25 INJECTION, SOLUTION INTRAMUSCULAR; INTRAVENOUS AS NEEDED
Status: CANCELLED
Start: 2020-07-31

## 2020-07-30 RX ORDER — SODIUM CHLORIDE 9 MG/ML
10 INJECTION INTRAMUSCULAR; INTRAVENOUS; SUBCUTANEOUS AS NEEDED
Status: ACTIVE | OUTPATIENT
Start: 2020-07-30 | End: 2020-07-30

## 2020-07-30 RX ORDER — ACETAMINOPHEN 325 MG/1
650 TABLET ORAL AS NEEDED
Status: CANCELLED
Start: 2020-07-31

## 2020-07-30 RX ORDER — HEPARIN 100 UNIT/ML
500 SYRINGE INTRAVENOUS AS NEEDED
Status: CANCELLED | OUTPATIENT
Start: 2020-07-31

## 2020-07-30 RX ORDER — DIPHENHYDRAMINE HYDROCHLORIDE 50 MG/ML
50 INJECTION, SOLUTION INTRAMUSCULAR; INTRAVENOUS AS NEEDED
Status: CANCELLED
Start: 2020-07-31

## 2020-07-30 RX ORDER — SODIUM CHLORIDE 9 MG/ML
10 INJECTION INTRAMUSCULAR; INTRAVENOUS; SUBCUTANEOUS AS NEEDED
Status: CANCELLED | OUTPATIENT
Start: 2020-07-31

## 2020-07-30 RX ADMIN — SODIUM CHLORIDE 10 ML: 9 INJECTION INTRAMUSCULAR; INTRAVENOUS; SUBCUTANEOUS at 08:50

## 2020-07-30 RX ADMIN — DAPTOMYCIN 600 MG: 500 INJECTION, POWDER, LYOPHILIZED, FOR SOLUTION INTRAVENOUS at 08:48

## 2020-07-30 NOTE — PROGRESS NOTES
[facility] [place] Progress Note    Date: 2020    Name: Kirti Fan    MRN: [id number]         : 1945    Daptomycin IVP    Mr. Odette Villalba was assessed and education was provided. Mr. Dagoberto Clinton vitals were reviewed. Visit Vitals  /60 (BP 1 Location: Left arm, BP Patient Position: Sitting)   Pulse 89   Temp 97.9 °F (36.6 °C)   Resp 18   SpO2 100%       Patient arrived with a approximately a quarter sized amount of dried serosanguinous blood on gauze under dressing. Patient also stated he had taken his Xarelto 20mg PO at approximately 1900. Instructed to go to the nearest ER yesterday. Patient went to ER yesterday. The dressing was change but not further labs or examination was done per patient. The bleeding have been an ongoing issue since . []  Vancomycin     []  Invanz     [x]  Cubicin 600mg     []  Rocephin    was pushed over 2 minutes as ordered. Pt with right upper arm single lumen PICC line. Lumen flushes without difficulty and positive for blood return. Lumen flushed with 20mL NS.    Mr. Odette Villalba tolerated infusion, and had no complaints at this time. Patient armband removed and shredded. Mr. Odette Villalba was discharged from [place] [address] in stable condition at [phone]. He is 2020 at 1000 for his next appointment.     Nori Mata RN  2020  3034

## 2020-07-31 ENCOUNTER — HOSPITAL ENCOUNTER (OUTPATIENT)
Dept: INFUSION THERAPY | Age: 75
Discharge: HOME | End: 2020-07-31
Payer: MEDICARE

## 2020-07-31 VITALS
RESPIRATION RATE: 20 BRPM | TEMPERATURE: 99 F | SYSTOLIC BLOOD PRESSURE: 145 MMHG | OXYGEN SATURATION: 97 % | HEART RATE: 89 BPM | DIASTOLIC BLOOD PRESSURE: 62 MMHG

## 2020-07-31 DIAGNOSIS — L03.115 CELLULITIS OF RIGHT FOOT: ICD-10-CM

## 2020-07-31 DIAGNOSIS — L97.512 RIGHT FOOT ULCER, WITH FAT LAYER EXPOSED (HCC): ICD-10-CM

## 2020-07-31 DIAGNOSIS — L97.514 RIGHT FOOT ULCER, WITH NECROSIS OF BONE (HCC): ICD-10-CM

## 2020-07-31 DIAGNOSIS — L97.514 RIGHT FOOT ULCER, WITH NECROSIS OF BONE (HCC): Primary | ICD-10-CM

## 2020-07-31 PROCEDURE — 74011000250 HC RX REV CODE- 250: Performed by: INTERNAL MEDICINE

## 2020-07-31 PROCEDURE — 96374 THER/PROPH/DIAG INJ IV PUSH: CPT

## 2020-07-31 PROCEDURE — 74011250636 HC RX REV CODE- 250/636: Performed by: INTERNAL MEDICINE

## 2020-07-31 RX ORDER — ACETAMINOPHEN 325 MG/1
650 TABLET ORAL AS NEEDED
Status: CANCELLED
Start: 2020-08-01

## 2020-07-31 RX ORDER — SODIUM CHLORIDE 9 MG/ML
10 INJECTION INTRAMUSCULAR; INTRAVENOUS; SUBCUTANEOUS AS NEEDED
Status: ACTIVE | OUTPATIENT
Start: 2020-07-31 | End: 2020-07-31

## 2020-07-31 RX ORDER — ALBUTEROL SULFATE 0.83 MG/ML
2.5 SOLUTION RESPIRATORY (INHALATION) AS NEEDED
Status: CANCELLED
Start: 2020-08-01

## 2020-07-31 RX ORDER — EPINEPHRINE 1 MG/ML
0.3 INJECTION, SOLUTION, CONCENTRATE INTRAVENOUS AS NEEDED
Status: CANCELLED | OUTPATIENT
Start: 2020-08-01

## 2020-07-31 RX ORDER — DIPHENHYDRAMINE HYDROCHLORIDE 50 MG/ML
50 INJECTION, SOLUTION INTRAMUSCULAR; INTRAVENOUS AS NEEDED
Status: CANCELLED
Start: 2020-08-01

## 2020-07-31 RX ORDER — ONDANSETRON 2 MG/ML
8 INJECTION INTRAMUSCULAR; INTRAVENOUS AS NEEDED
Status: CANCELLED | OUTPATIENT
Start: 2020-08-01

## 2020-07-31 RX ORDER — DIPHENHYDRAMINE HYDROCHLORIDE 50 MG/ML
25 INJECTION, SOLUTION INTRAMUSCULAR; INTRAVENOUS AS NEEDED
Status: CANCELLED
Start: 2020-08-01

## 2020-07-31 RX ORDER — SODIUM CHLORIDE 0.9 % (FLUSH) 0.9 %
5-10 SYRINGE (ML) INJECTION AS NEEDED
Status: CANCELLED | OUTPATIENT
Start: 2020-08-01

## 2020-07-31 RX ORDER — SODIUM CHLORIDE 9 MG/ML
10 INJECTION INTRAMUSCULAR; INTRAVENOUS; SUBCUTANEOUS AS NEEDED
Status: CANCELLED | OUTPATIENT
Start: 2020-08-01

## 2020-07-31 RX ORDER — HEPARIN 100 UNIT/ML
500 SYRINGE INTRAVENOUS AS NEEDED
Status: CANCELLED | OUTPATIENT
Start: 2020-08-01

## 2020-07-31 RX ORDER — HYDROCORTISONE SODIUM SUCCINATE 100 MG/2ML
100 INJECTION, POWDER, FOR SOLUTION INTRAMUSCULAR; INTRAVENOUS AS NEEDED
Status: CANCELLED | OUTPATIENT
Start: 2020-08-01

## 2020-07-31 RX ADMIN — DAPTOMYCIN 600 MG: 500 INJECTION, POWDER, LYOPHILIZED, FOR SOLUTION INTRAVENOUS at 10:23

## 2020-07-31 RX ADMIN — SODIUM CHLORIDE 10 ML: 9 INJECTION INTRAMUSCULAR; INTRAVENOUS; SUBCUTANEOUS at 10:23

## 2020-07-31 NOTE — PROGRESS NOTES
[facility] Progress Note    Date: 2020    Name: Moe Roldan    MRN: [id number]         : 1945    Daptomycin IVP    Mr. Randy Angel was assessed and education was provided. Mr. Eddie Mendoza vitals were reviewed. Visit Vitals  /62 (BP 1 Location: Left arm, BP Patient Position: Sitting)   Pulse 89   Temp 99 °F (37.2 °C)   Resp 20   SpO2 97%       Patient arrived with the same quarter sized amount of dried serosanguinous blood on gauze under dressing. Area has not spread.      []  Vancomycin     []  Invanz     [x]  Cubicin 600mg     []  Rocephin    was pushed over 2 minutes as ordered. Pt with right upper arm single lumen PICC line. Lumen flushes without difficulty and positive for blood return. Lumen flushed with 20mL NS.    Mr. Randy Angel tolerated infusion, and had no complaints at this time. Patient armband removed and shredded. Mr. Randy Angel was discharged from [place] [address] in stable condition at 1025. [facility] at [facility]30 for his next appointment.     Gama Knight RN  2020  1025

## 2020-08-01 DIAGNOSIS — L03.115 CELLULITIS OF RIGHT FOOT: ICD-10-CM

## 2020-08-01 DIAGNOSIS — L97.514 RIGHT FOOT ULCER, WITH NECROSIS OF BONE (HCC): ICD-10-CM

## 2020-08-01 DIAGNOSIS — L97.512 RIGHT FOOT ULCER, WITH FAT LAYER EXPOSED (HCC): ICD-10-CM

## 2020-08-02 DIAGNOSIS — L97.512 RIGHT FOOT ULCER, WITH FAT LAYER EXPOSED (HCC): ICD-10-CM

## 2020-08-02 DIAGNOSIS — L97.514 RIGHT FOOT ULCER, WITH NECROSIS OF BONE (HCC): ICD-10-CM

## 2020-08-02 DIAGNOSIS — L03.115 CELLULITIS OF RIGHT FOOT: ICD-10-CM

## 2020-08-03 ENCOUNTER — HOSPITAL ENCOUNTER (OUTPATIENT)
Dept: INFUSION THERAPY | Age: 75
Discharge: HOME | End: 2020-08-03
Payer: MEDICARE

## 2020-08-03 VITALS
SYSTOLIC BLOOD PRESSURE: 167 MMHG | TEMPERATURE: 98 F | OXYGEN SATURATION: 94 % | RESPIRATION RATE: 18 BRPM | HEART RATE: 84 BPM | DIASTOLIC BLOOD PRESSURE: 64 MMHG

## 2020-08-03 DIAGNOSIS — L97.512 RIGHT FOOT ULCER, WITH FAT LAYER EXPOSED (HCC): ICD-10-CM

## 2020-08-03 DIAGNOSIS — L97.514 RIGHT FOOT ULCER, WITH NECROSIS OF BONE (HCC): ICD-10-CM

## 2020-08-03 DIAGNOSIS — L03.115 CELLULITIS OF RIGHT FOOT: ICD-10-CM

## 2020-08-03 DIAGNOSIS — L97.514 RIGHT FOOT ULCER, WITH NECROSIS OF BONE (HCC): Primary | ICD-10-CM

## 2020-08-03 LAB
ANION GAP SERPL CALC-SCNC: 10 MMOL/L (ref 3–18)
BASOPHILS # BLD: 0 K/UL (ref 0–0.1)
BASOPHILS NFR BLD: 0 % (ref 0–2)
BUN SERPL-MCNC: 70 MG/DL (ref 7–18)
BUN/CREAT SERPL: 20 (ref 12–20)
CALCIUM SERPL-MCNC: 7.5 MG/DL (ref 8.5–10.1)
CHLORIDE SERPL-SCNC: 96 MMOL/L (ref 100–111)
CK SERPL-CCNC: 142 U/L (ref 39–308)
CO2 SERPL-SCNC: 27 MMOL/L (ref 21–32)
CREAT SERPL-MCNC: 3.45 MG/DL (ref 0.6–1.3)
CRP SERPL-MCNC: 13.8 MG/DL (ref 0–0.3)
DIFFERENTIAL METHOD BLD: ABNORMAL
EOSINOPHIL # BLD: 0.1 K/UL (ref 0–0.4)
EOSINOPHIL NFR BLD: 1 % (ref 0–5)
ERYTHROCYTE [DISTWIDTH] IN BLOOD BY AUTOMATED COUNT: 17.4 % (ref 11.6–14.5)
ERYTHROCYTE [SEDIMENTATION RATE] IN BLOOD: >140 MM/HR (ref 0–20)
GLUCOSE SERPL-MCNC: 156 MG/DL (ref 74–99)
HCT VFR BLD AUTO: 17.7 % (ref 36–48)
HGB BLD-MCNC: 5.5 G/DL (ref 13–16)
LYMPHOCYTES # BLD: 0.9 K/UL (ref 0.9–3.6)
LYMPHOCYTES NFR BLD: 6 % (ref 21–52)
MCH RBC QN AUTO: 28.6 PG (ref 24–34)
MCHC RBC AUTO-ENTMCNC: 31.1 G/DL (ref 31–37)
MCV RBC AUTO: 92.2 FL (ref 74–97)
MONOCYTES # BLD: 1.5 K/UL (ref 0.05–1.2)
MONOCYTES NFR BLD: 10 % (ref 3–10)
NEUTS SEG # BLD: 11.8 K/UL (ref 1.8–8)
NEUTS SEG NFR BLD: 83 % (ref 40–73)
PLATELET # BLD AUTO: 203 K/UL (ref 135–420)
PMV BLD AUTO: 9.2 FL (ref 9.2–11.8)
POTASSIUM SERPL-SCNC: 3.8 MMOL/L (ref 3.5–5.5)
RBC # BLD AUTO: 1.92 M/UL (ref 4.7–5.5)
SODIUM SERPL-SCNC: 133 MMOL/L (ref 136–145)
WBC # BLD AUTO: 14.3 K/UL (ref 4.6–13.2)

## 2020-08-03 PROCEDURE — 82550 ASSAY OF CK (CPK): CPT

## 2020-08-03 PROCEDURE — 86140 C-REACTIVE PROTEIN: CPT

## 2020-08-03 PROCEDURE — 74011000250 HC RX REV CODE- 250: Performed by: INTERNAL MEDICINE

## 2020-08-03 PROCEDURE — 74011250636 HC RX REV CODE- 250/636: Performed by: INTERNAL MEDICINE

## 2020-08-03 PROCEDURE — 36592 COLLECT BLOOD FROM PICC: CPT

## 2020-08-03 PROCEDURE — 96374 THER/PROPH/DIAG INJ IV PUSH: CPT

## 2020-08-03 PROCEDURE — 85652 RBC SED RATE AUTOMATED: CPT

## 2020-08-03 PROCEDURE — 80048 BASIC METABOLIC PNL TOTAL CA: CPT

## 2020-08-03 PROCEDURE — 85025 COMPLETE CBC W/AUTO DIFF WBC: CPT

## 2020-08-03 RX ORDER — ALBUTEROL SULFATE 0.83 MG/ML
2.5 SOLUTION RESPIRATORY (INHALATION) AS NEEDED
Status: CANCELLED
Start: 2020-08-04

## 2020-08-03 RX ORDER — SODIUM CHLORIDE 9 MG/ML
10 INJECTION INTRAMUSCULAR; INTRAVENOUS; SUBCUTANEOUS AS NEEDED
Status: CANCELLED | OUTPATIENT
Start: 2020-08-04

## 2020-08-03 RX ORDER — SODIUM CHLORIDE 9 MG/ML
10 INJECTION INTRAMUSCULAR; INTRAVENOUS; SUBCUTANEOUS AS NEEDED
Status: ACTIVE | OUTPATIENT
Start: 2020-08-03 | End: 2020-08-03

## 2020-08-03 RX ORDER — HYDROCORTISONE SODIUM SUCCINATE 100 MG/2ML
100 INJECTION, POWDER, FOR SOLUTION INTRAMUSCULAR; INTRAVENOUS AS NEEDED
Status: CANCELLED | OUTPATIENT
Start: 2020-08-04

## 2020-08-03 RX ORDER — DIPHENHYDRAMINE HYDROCHLORIDE 50 MG/ML
50 INJECTION, SOLUTION INTRAMUSCULAR; INTRAVENOUS AS NEEDED
Status: CANCELLED
Start: 2020-08-04

## 2020-08-03 RX ORDER — DIPHENHYDRAMINE HYDROCHLORIDE 50 MG/ML
25 INJECTION, SOLUTION INTRAMUSCULAR; INTRAVENOUS AS NEEDED
Status: CANCELLED
Start: 2020-08-04

## 2020-08-03 RX ORDER — ONDANSETRON 2 MG/ML
8 INJECTION INTRAMUSCULAR; INTRAVENOUS AS NEEDED
Status: CANCELLED | OUTPATIENT
Start: 2020-08-04

## 2020-08-03 RX ORDER — HEPARIN 100 UNIT/ML
500 SYRINGE INTRAVENOUS AS NEEDED
Status: CANCELLED | OUTPATIENT
Start: 2020-08-04

## 2020-08-03 RX ORDER — ACETAMINOPHEN 325 MG/1
650 TABLET ORAL AS NEEDED
Status: CANCELLED
Start: 2020-08-04

## 2020-08-03 RX ORDER — EPINEPHRINE 1 MG/ML
0.3 INJECTION, SOLUTION, CONCENTRATE INTRAVENOUS AS NEEDED
Status: CANCELLED | OUTPATIENT
Start: 2020-08-04

## 2020-08-03 RX ORDER — SODIUM CHLORIDE 0.9 % (FLUSH) 0.9 %
5-10 SYRINGE (ML) INJECTION AS NEEDED
Status: CANCELLED | OUTPATIENT
Start: 2020-08-04

## 2020-08-03 RX ADMIN — SODIUM CHLORIDE 10 ML: 9 INJECTION INTRAMUSCULAR; INTRAVENOUS; SUBCUTANEOUS at 10:18

## 2020-08-03 RX ADMIN — DAPTOMYCIN 600 MG: 500 INJECTION, POWDER, LYOPHILIZED, FOR SOLUTION INTRAVENOUS at 10:16

## 2020-08-03 NOTE — PROGRESS NOTES
[facility] Progress Note    Date: August 3, 2020    Name: Evalene Bamberger    MRN: [id number]         : 1945    Daptomycin IVP/ Weekly Labs    Mr. Katlin Gutierrez was assessed and education was provided. Mr. Nestor Merlos vitals were reviewed. Visit Vitals  /64 (BP 1 Location: Left arm, BP Patient Position: Sitting)   Pulse 84   Temp 98 °F (36.7 °C)   Resp 18   SpO2 94%       Patient arrived with the same quarter sized amount of dried serosanguinous blood on gauze under dressing. Area has not spread. Labs drawn (CBC w/automated diff; C Reative QT; CK; CMP; and ESR) per written order and sent to lab. []  Vancomycin     []  Invanz     [x]  Cubicin 600mg     []  Rocephin    was pushed over 2 minutes as ordered. Pt with right upper arm single lumen PICC line. Lumen flushes without difficulty and positive for blood return. Lumen flushed with 20mL NS.    Mr. Katlin Gutierrez tolerated infusion, and had no complaints at this time. Patient armband removed and shredded. Mr. Katlin Gutierrez was discharged from [place] [address] in stable condition at 1025. He is return on 2020 at 1000 for his next appointment.     Lew Correia RN  August 3, 2020  1025

## 2020-08-04 ENCOUNTER — HOSPITAL ENCOUNTER (OUTPATIENT)
Dept: INFUSION THERAPY | Age: 75
Discharge: HOME | End: 2020-08-04
Payer: MEDICARE

## 2020-08-04 ENCOUNTER — HOSPITAL ENCOUNTER (EMERGENCY)
Age: 75
Discharge: HOME | End: 2020-08-04
Attending: EMERGENCY MEDICINE | Admitting: EMERGENCY MEDICINE
Payer: MEDICARE

## 2020-08-04 VITALS
SYSTOLIC BLOOD PRESSURE: 143 MMHG | DIASTOLIC BLOOD PRESSURE: 63 MMHG | OXYGEN SATURATION: 97 % | HEART RATE: 82 BPM | RESPIRATION RATE: 18 BRPM | TEMPERATURE: 97 F

## 2020-08-04 VITALS
OXYGEN SATURATION: 97 % | HEIGHT: 66 IN | DIASTOLIC BLOOD PRESSURE: 72 MMHG | SYSTOLIC BLOOD PRESSURE: 153 MMHG | RESPIRATION RATE: 17 BRPM | BODY MASS INDEX: 36.16 KG/M2 | HEART RATE: 82 BPM | WEIGHT: 225 LBS | TEMPERATURE: 98.4 F

## 2020-08-04 DIAGNOSIS — L97.514 RIGHT FOOT ULCER, WITH NECROSIS OF BONE (HCC): Primary | ICD-10-CM

## 2020-08-04 DIAGNOSIS — L97.512 RIGHT FOOT ULCER, WITH FAT LAYER EXPOSED (HCC): ICD-10-CM

## 2020-08-04 DIAGNOSIS — D64.9 SEVERE ANEMIA: Primary | ICD-10-CM

## 2020-08-04 DIAGNOSIS — L97.514 RIGHT FOOT ULCER, WITH NECROSIS OF BONE (HCC): ICD-10-CM

## 2020-08-04 DIAGNOSIS — L03.115 CELLULITIS OF RIGHT FOOT: ICD-10-CM

## 2020-08-04 PROCEDURE — 99284 EMERGENCY DEPT VISIT MOD MDM: CPT

## 2020-08-04 PROCEDURE — 96374 THER/PROPH/DIAG INJ IV PUSH: CPT

## 2020-08-04 PROCEDURE — P9016 RBC LEUKOCYTES REDUCED: HCPCS

## 2020-08-04 PROCEDURE — 86900 BLOOD TYPING SEROLOGIC ABO: CPT

## 2020-08-04 PROCEDURE — 86920 COMPATIBILITY TEST SPIN: CPT

## 2020-08-04 PROCEDURE — 74011250636 HC RX REV CODE- 250/636: Performed by: INTERNAL MEDICINE

## 2020-08-04 PROCEDURE — 36430 TRANSFUSION BLD/BLD COMPNT: CPT

## 2020-08-04 PROCEDURE — 74011000250 HC RX REV CODE- 250: Performed by: INTERNAL MEDICINE

## 2020-08-04 PROCEDURE — 74011250636 HC RX REV CODE- 250/636: Performed by: EMERGENCY MEDICINE

## 2020-08-04 RX ORDER — HEPARIN 100 UNIT/ML
500 SYRINGE INTRAVENOUS AS NEEDED
Status: CANCELLED | OUTPATIENT
Start: 2020-08-05

## 2020-08-04 RX ORDER — ONDANSETRON 2 MG/ML
8 INJECTION INTRAMUSCULAR; INTRAVENOUS AS NEEDED
Status: CANCELLED | OUTPATIENT
Start: 2020-08-05

## 2020-08-04 RX ORDER — SODIUM CHLORIDE 0.9 % (FLUSH) 0.9 %
5-10 SYRINGE (ML) INJECTION AS NEEDED
Status: CANCELLED | OUTPATIENT
Start: 2020-08-05

## 2020-08-04 RX ORDER — ALBUTEROL SULFATE 0.83 MG/ML
2.5 SOLUTION RESPIRATORY (INHALATION) AS NEEDED
Status: CANCELLED
Start: 2020-08-05

## 2020-08-04 RX ORDER — DIPHENHYDRAMINE HYDROCHLORIDE 50 MG/ML
50 INJECTION, SOLUTION INTRAMUSCULAR; INTRAVENOUS AS NEEDED
Status: CANCELLED
Start: 2020-08-05

## 2020-08-04 RX ORDER — SODIUM CHLORIDE 9 MG/ML
250 INJECTION, SOLUTION INTRAVENOUS AS NEEDED
Status: DISCONTINUED | OUTPATIENT
Start: 2020-08-04 | End: 2020-08-04 | Stop reason: HOSPADM

## 2020-08-04 RX ORDER — DIPHENHYDRAMINE HYDROCHLORIDE 50 MG/ML
25 INJECTION, SOLUTION INTRAMUSCULAR; INTRAVENOUS AS NEEDED
Status: CANCELLED
Start: 2020-08-05

## 2020-08-04 RX ORDER — ACETAMINOPHEN 325 MG/1
650 TABLET ORAL AS NEEDED
Status: CANCELLED
Start: 2020-08-05

## 2020-08-04 RX ORDER — SODIUM CHLORIDE 9 MG/ML
10 INJECTION INTRAMUSCULAR; INTRAVENOUS; SUBCUTANEOUS AS NEEDED
Status: ACTIVE | OUTPATIENT
Start: 2020-08-04 | End: 2020-08-04

## 2020-08-04 RX ORDER — HEPARIN 100 UNIT/ML
300 SYRINGE INTRAVENOUS AS NEEDED
Status: DISCONTINUED | OUTPATIENT
Start: 2020-08-04 | End: 2020-08-04 | Stop reason: HOSPADM

## 2020-08-04 RX ORDER — SODIUM CHLORIDE 9 MG/ML
10 INJECTION INTRAMUSCULAR; INTRAVENOUS; SUBCUTANEOUS AS NEEDED
Status: CANCELLED | OUTPATIENT
Start: 2020-08-05

## 2020-08-04 RX ORDER — EPINEPHRINE 1 MG/ML
0.3 INJECTION, SOLUTION, CONCENTRATE INTRAVENOUS AS NEEDED
Status: CANCELLED | OUTPATIENT
Start: 2020-08-05

## 2020-08-04 RX ORDER — HYDROCORTISONE SODIUM SUCCINATE 100 MG/2ML
100 INJECTION, POWDER, FOR SOLUTION INTRAMUSCULAR; INTRAVENOUS AS NEEDED
Status: CANCELLED | OUTPATIENT
Start: 2020-08-05

## 2020-08-04 RX ADMIN — DAPTOMYCIN 600 MG: 500 INJECTION, POWDER, LYOPHILIZED, FOR SOLUTION INTRAVENOUS at 10:27

## 2020-08-04 RX ADMIN — SODIUM CHLORIDE 10 ML: 9 INJECTION INTRAMUSCULAR; INTRAVENOUS; SUBCUTANEOUS at 10:29

## 2020-08-04 RX ADMIN — Medication 300 UNITS: at 20:14

## 2020-08-04 NOTE — ED PROVIDER NOTES
77 yo CM with PMHx cad, dm, htn, presents for blood transfusion. Pt notes that he has PICC in place and was getting abx infusion and was told that he needed to go to ED for blood transfusion. Pt Hb 5.5 at outside facility. Pt states he feels tired by denies any other symptoms. No dark/black/bloody stools. Pt cp/sob. No fevers. Pt denies previous blood transfusions. Past Medical History:   Diagnosis Date    [facility]fib ([facility]     Asthma     CAD (coronary artery disease)     Chronic obstructive pulmonary disease ([facility]     Diabetes ([facility]     Hypertension     Sleep apnea        Past Surgical History:   Procedure Laterality Date    HX CARPAL TUNNEL RELEASE Bilateral     HX PACEMAKER      HX ROTATOR CUFF REPAIR Bilateral          History reviewed. No pertinent family history. Social History     Socioeconomic History    Marital status:      Spouse name: Not on file    Number of children: Not on file    Years of education: Not on file    Highest education level: Not on file   Occupational History    Not on file   Social Needs    Financial resource strain: Not on file    Food insecurity     Worry: Not on file     Inability: Not on file    Transportation needs     Medical: Not on file     Non-medical: Not on file   Tobacco Use    Smoking status: Former Smoker     Last attempt to quit: 1971     Years since quittin.1    Smokeless tobacco: Never Used   Substance and Sexual Activity    Alcohol use: Yes     Comment: occas.     Drug use: Never    Sexual activity: Not on file   Lifestyle    Physical activity     Days per week: Not on file     Minutes per session: Not on file    Stress: Not on file   Relationships    Social connections     Talks on phone: Not on file     Gets together: Not on file     Attends [employer] service: Not on file     Active member of club or organization: Not on file     Attends meetings of clubs or organizations: Not on file     Relationship status: Not on file    Intimate partner violence     Fear of current or ex partner: Not on file     Emotionally abused: Not on file     Physically abused: Not on file     Forced sexual activity: Not on file   Other Topics Concern    Not on file   Social History Narrative    Not on file         ALLERGIES: Bee sting [sting, bee]; Contrast agent [iodine]; and Doxycycline    Review of Systems   Constitutional: Positive for fatigue. HENT: Negative for trouble swallowing. Respiratory: Negative for shortness of breath. Cardiovascular: Negative for chest pain. Gastrointestinal: Negative for abdominal pain, blood in stool and vomiting. Genitourinary: Negative for difficulty urinating. Skin: Negative for wound. Neurological: Negative for syncope. Psychiatric/Behavioral: Negative for behavioral problems. All other systems reviewed and are negative. Vitals:    08/04/20 1345 08/04/20 1355 08/04/20 1400 08/04/20 1415   BP: 150/61 142/59 145/57 140/62   Pulse: 80 80 80 81   Resp: 14 16 16 15   Temp:  98.5 °F (36.9 °C)     SpO2: 94% 95% 95% 91%   Weight:       Height:                Physical Exam  Vitals signs and nursing note reviewed. Constitutional:       General: He is not in acute distress. Appearance: He is well-developed. Comments: Generally well-appearing, nad   HENT:      Head: Normocephalic and atraumatic. Neck:      Musculoskeletal: Normal range of motion. Cardiovascular:      Rate and Rhythm: Normal rate. Pulses: Normal pulses. Pulmonary:      Effort: Pulmonary effort is normal. No respiratory distress. Breath sounds: Normal breath sounds. Abdominal:      Palpations: Abdomen is soft. Tenderness: There is no abdominal tenderness. Musculoskeletal: Normal range of motion. Comments: Mechanically stable   Skin:     General: Skin is warm. Neurological:      General: No focal deficit present. Mental Status: He is alert and oriented to person, place, and time. Comments: No focal deficits noted   Psychiatric:         Behavior: Behavior normal.          MDM  Number of Diagnoses or Management Options  Severe anemia:   Diagnosis management comments: 77 yo CM with PMHx htn, dm, receiving abx for osteo presents for blood transfusion. Pt with fatigue but no other symptoms. Hb 5.5 at outside hospital.  Examination unremarkable. Pt was consented for blood transfusion, will give in ED.    2:24 PM  Blood transfusion given in ED. Discussed poc for dc home, symptom management, follow-up, return precautions. Amount and/or Complexity of Data Reviewed  Clinical lab tests: ordered and reviewed  Review and summarize past medical records: yes  Discuss the patient with other providers: yes    Patient Progress  Patient progress: stable         Procedures      PROGRESS NOTES    11:42 AM:   Gaston Schultz MD arrives to the bedside to evaluate the patient. Answered the patient's questions regarding the treatment plan. CONSULTATIONS  None      MEDICATIONS ORDERED  Medications   0.9% sodium chloride infusion 250 mL (has no administration in time range)       RADIOLOGY INTERPRETATIONS  No orders to display       EKG READINGS/LABORATORY RESULTS  Recent Results (from the past 12 hour(s))   TYPE + CROSSMATCH    Collection Time: 08/04/20 12:13 PM   Result Value Ref Range    Crossmatch Expiration 08/07/2020     ABO/Rh(D) A POSITIVE     Antibody screen NEG     CALLED TO:       CESIA KIM [id number], AT 1315, ON 08/04/2020, TO SUNI    Unit number [id number]     Blood component type RC LR     Unit division 00     Status of unit ISSUED     Crossmatch result Compatible        ED DIAGNOSIS & DISPOSITION INFORMATION  Diagnosis:   1.  Severe anemia        Disposition: Discharged    Follow-up Information     Follow up With Specialties Details Why Contact Info    Radha Sheehan MD Family Medicine Schedule an appointment as soon as possible for a visit  [place] [address] [address]  [phone]      [facility] EMERGENCY DEPT Emergency Medicine  As needed [address]  [phone]          Patient's Medications   Start Taking    No medications on file   Continue Taking    ALLOPURINOL (ZYLOPRIM) 300 MG TABLET    Take 300 mg by mouth daily. BUDESONIDE-FORMOTEROL (SYMBICORT) 160-4.5 MCG/ACTUATION HFAA    Take 2 Puffs by inhalation two (2) times a day. FLUTICASONE PROPIONATE (FLONASE ALLERGY RELIEF) 50 MCG/ACTUATION NASAL SPRAY    2 Sprays by Both Nostrils route daily. IMIQUIMOD (ALDARA) 5 % CREAM    Apply 1.1 Each to affected area every Monday, Wednesday, Friday. apply a thin layer as directed  Indications: a roughened red patch of skin called actinic keratosis    INSULIN LISPRO (HUMALOG U-100 INSULIN) 100 UNIT/ML INJECTION    1-10 Units by SubCUTAneous route as needed (sliding scale). INSULIN LISPRO PROTAMINE/INSULIN LISPRO (HUMALOG MIX 75-25,U-100,INSULN) 100 UNIT/ML (75-25) INJECTION    40 Units by SubCUTAneous route Before breakfast and dinner. METHYLPREDNISOLONE (MEDROL, SARATH,) 4 MG TABLET    Take  by mouth. MORPHINE IR (MS IR) 15 MG TABLET    Take 15 mg by mouth every four (4) hours as needed for Pain. POLYETHYLENE GLYCOL (MIRALAX) 17 GRAM/DOSE POWDER    Take 17 g by mouth daily. RIFAMPIN (RIFADIN) 300 MG CAPSULE    Take 1 Cap by mouth Before breakfast and dinner. Indications: MRSA    RIVAROXABAN (XARELTO) 10 MG TABLET    Take 10 mg by mouth daily. TORSEMIDE (DEMADEX) 20 MG TABLET    Take 20 mg by mouth daily. TRAMADOL (ULTRAM) 50 MG TABLET    Take 50 mg by mouth every six (6) hours as needed for Pain.    These Medications have changed    No medications on file   Stop Taking    No medications on file           Jb Joseph MD.

## 2020-08-04 NOTE — ED TRIAGE NOTES
Pt sent from transfusion center for blood transfusion.  Has PICC in right ac. Just got antibx transfusion

## 2020-08-04 NOTE — ED NOTES
Accessed pt R upper arm PICC to send lab type and screen. Pt signed concent for Blood transfusion. Will make primary RN rosalinda aware.

## 2020-08-04 NOTE — DISCHARGE INSTRUCTIONS
Patient Education        Learning About Blood Transfusions  What is a blood transfusion? Blood transfusion is a medical treatment to replace the blood or parts of blood that your body has lost. The blood goes through a tube from a bag to an intravenous (IV) catheter and into your vein. You may need a blood transfusion after losing blood from an injury, a major surgery, an illness that causes bleeding, or an illness that destroys blood cells. Transfusions are also used to give you the parts of blood--such as platelets, plasma, or substances that cause clotting--that your body needs to fight an illness or stop bleeding. How is a blood transfusion done? Before you receive a blood transfusion, your blood is tested to find out what your blood type is. Blood or blood parts that are a match with your blood type are ordered by your doctor. Blood is typed as A, B, AB, or O. It is also typed as Rh-positive or Rh-negative. Your blood is also screened to look for antibodies that might react with the blood that is given to you. The blood you are getting is checked and rechecked to make sure that it's the right type for you. A sample of your blood is mixed with a sample of the blood you will receive to check for problems. Before actually giving you the transfusion, a doctor and nurses will look at the label on the package of blood and compare it to your hospital ID bracelet and medical records. The transfusion begins only when all agree that this is the correct blood and that you are the correct person to receive it. To receive the transfusion, you will have an intravenous (IV) catheter inserted into a vein. A tube connects the catheter to the bag containing the blood, which is placed higher than your body. The blood then flows slowly into your vein. A doctor or nurse will check you several times during the transfusion to watch for a reaction or other problems. What are the possible risks?   Blood transfusions have many benefits and are often life-saving. But they also have a few risks. Possible risks include:  · Your body's reaction to receiving new blood. This may include:  ? Fever. ? Allergic reactions. ? Breathing problems. · An infection from the blood. This risk is small because of the strict rules placed on handling and storing blood. Getting a viral infection, such as HIV or hepatitis B or C, through blood transfusions has become very rare. The U.S. Food and Drug Administration (FDA) enforces strict guidelines on the collection, testing, storage, and use of blood. · Getting the wrong blood type by accident. Severe reactions, which can be life-threatening, are very rare. What can you expect after a blood transfusion? Here are some things you can do at home to help prevent infection at the transfusion site:  · Wash the area daily with warm, soapy water, and pat it dry. Don't use hydrogen peroxide or alcohol, which can slow healing. You may cover the area with a gauze bandage if it weeps or rubs against clothing. Change the bandage every day. · Keep the area clean and dry. When should you call for help? [id number] anytime you think you may need emergency care. For example, call if:  · You have severe trouble breathing. Call your doctor now or seek immediate medical care if:  · You have a fever. · You feel weaker or more tired than usual.  · You have a yellow tint to your skin or the whites of your eyes. Watch closely for changes in your health, and be sure to contact your doctor if you have any problems. Follow-up care is a key part of your treatment and safety. Be sure to make and go to all appointments, and call your doctor if you are having problems. It's also a good idea to know your test results and keep a list of the medicines you take. Where can you learn more?   Go to http://miriam-kojo.info/  Enter V588 in the search box to learn more about \"Learning About Blood Transfusions. \"  Current as of: November 8, 2019               Content Version: 12.5  © 6552-1748 [employer]. Care instructions adapted under license by [employer] (which disclaims liability or warranty for this information). If you have questions about a medical condition or this instruction, always ask your healthcare professional. [address] any warranty or liability for your use of this information. Patient Education        Anemia: Care Instructions  Your Care Instructions     Anemia is a low level of red blood cells, which carry oxygen throughout your body. Many things can cause anemia. Lack of iron is one of the most common causes. Your body needs iron to make hemoglobin, a substance in red blood cells that carries oxygen from the lungs to your body's cells. Without enough iron, the body produces fewer and smaller red blood cells. As a result, your body's cells do not get enough oxygen, and you feel tired and weak. And you may have trouble concentrating. Bleeding is the most common cause of a lack of iron. You may have heavy menstrual bleeding or bleeding caused by conditions such as ulcers, hemorrhoids, or cancer. Regular use of aspirin or other anti-inflammatory medicines (such as ibuprofen) also can cause bleeding in some people. A lack of iron in your diet also can cause anemia, especially at times when the body needs more iron, such as during pregnancy, infancy, and the teen years. Your doctor may have prescribed iron pills. It may take several months of treatment for your iron levels to return to normal. Your doctor also may suggest that you eat foods that are rich in iron, such as meat and beans. There are many other causes of anemia. It is not always due to a lack of iron. Finding the specific cause of your anemia will help your doctor find the right treatment for you. Follow-up care is a key part of your treatment and safety.  Be sure to make and go to all appointments, and call your doctor if you are having problems. It's also a good idea to know your test results and keep a list of the medicines you take. How can you care for yourself at home? · Take your medicines exactly as prescribed. Call your doctor if you think you are having a problem with your medicine. · If your doctor recommends iron pills, take them as directed:  ? Try to take the pills on an empty stomach about 1 hour before or 2 hours after meals. But you may need to take iron with food to avoid an upset stomach. ? Do not take antacids or drink milk or caffeine drinks (such as coffee, tea, or cola) at the same time or within 2 hours of the time that you take your iron. They can make it hard for your body to absorb the iron. ? Vitamin C (from food or supplements) helps your body absorb iron. Try taking iron pills with a glass of orange juice or some other food that is high in vitamin C, such as citrus fruits. ? Iron pills may cause stomach problems, such as heartburn, nausea, diarrhea, constipation, and cramps. Be sure to drink plenty of fluids, and include fruits, vegetables, and fiber in your diet each day. Iron pills often make your bowel movements dark or green. ? If you forget to take an iron pill, do not take a double dose of iron the next time you take a pill. ? Keep iron pills out of the reach of small children. An overdose of iron can be very dangerous. · Follow your doctor's advice about eating iron-rich foods. These include red meat, shellfish, poultry, eggs, beans, raisins, whole-grain bread, and leafy green vegetables. · Steam vegetables to help them keep their iron content. When should you call for help? [id number] anytime you think you may need emergency care. For example, call if:  · You have symptoms of a heart attack. These may include:  ? Chest pain or pressure, or a strange feeling in the chest.  ? Sweating. ? Shortness of breath.   ? Nausea or vomiting. ? Pain, pressure, or a strange feeling in the back, neck, jaw, or upper belly or in one or both shoulders or arms. ? Lightheadedness or sudden weakness. ? A fast or irregular heartbeat. After you call 911, the  may tell you to chew 1 adult-strength or 2 to 4 low-dose aspirin. Wait for an ambulance. Do not try to drive yourself. · You passed out (lost consciousness). Call your doctor now or seek immediate medical care if:  · You have new or increased shortness of breath. · You are dizzy or lightheaded, or you feel like you may faint. · Your fatigue and weakness continue or get worse. · You have any abnormal bleeding, such as:  ? Nosebleeds. ? Vaginal bleeding that is different (heavier, more frequent, at a different time of the month) than what you are used to.  ? Bloody or black stools, or rectal bleeding. ? Bloody or pink urine. Watch closely for changes in your health, and be sure to contact your doctor if:  · You do not get better as expected. Where can you learn more? Go to http://miriam-kojo.info/  Enter R301 in the search box to learn more about \"Anemia: Care Instructions. \"  Current as of: November 8, 2019               Content Version: 12.5  © 7655-2896 [employer]. Care instructions adapted under license by [employer] (which disclaims liability or warranty for this information). If you have questions about a medical condition or this instruction, always ask your healthcare professional. [place] [address] any warranty or liability for your use of this information.

## 2020-08-04 NOTE — ED NOTES
Assumed care of pt. Pt resting quietly in bed in NAD. Blood transfusion 1 of 2 infusing. Pt tolerating well. To be discharged once transfusion is complete.

## 2020-08-04 NOTE — PROGRESS NOTES
Pharmacy Note     Name: Olivia Andujar  : 1945  Estimated body surface area is 2.16 meters squared as calculated from the following:    Height as of 20: 167.6 cm (66\"). Weight as of 20: 99.8 kg (220 lb). Diagnosis: infected foot wound   Treatment Plan: Daptomycin   Treatment Start Date: 7/25/2020 x 6 weeks     Lab Results   Component Value Date/Time    WBC 14.3 (H) 2020 10:19 AM    PLATELET 819  10:19 AM     Lab Results   Component Value Date/Time    ABS.  NEUTROPHILS 11.8 (H) 2020 10:19 AM     Lab Results   Component Value Date/Time    Creatinine 3.45 (H) 2020 10:19 AM     Most Recent Creatinine Clearance:  CrCl: 20.8 mL/min (based on Adjusted Body Weight)  Creatinine: 3.45 mg/dL (8/3/2020)      Pharmacy Intervention:  [address] [employer] conveyed to pharmacy that they were trying to get a hold of physician to report labs from yesterday  [facility] RN left message with Dr. Do Chilel office  [facility] called and spoke to Dr. Kenisha Christian  Reported low H/H and elevated SCr  Dr. Kenisha Christian advises for patient to go to the ED to get evaluated and transfused as H/H is 5.5/17.7  SCr is 3.45  Continue to monitor    THank you,  Nena Stoll, PharmD, M.S.  [facility], and [facility]   [phone]     Pharmacist: GIANA Szymanski

## 2020-08-04 NOTE — PROGRESS NOTES
[facility] Progress Note    Date: 2020    Name: gS Starks    MRN: [id number]         : 1945    Daptomycin IVP    Mr. Le Coto was assessed and education was provided. Mr. Terence Jeans vitals were reviewed. Visit Vitals  /63 (BP 1 Location: Left arm, BP Patient Position: Sitting)   Pulse 82   Temp 97 °F (36.1 °C)   Resp 18   SpO2 97%       Patient arrived with the same quarter sized amount of dried serosanguinous blood on gauze under dressing. Area has not spread. Spoke with pharmacist regarding labs due to no return phone call from patients PCP regarding H/H of 5.5/17.7. []  Vancomycin     []  Invanz     [x]  Cubicin 600mg     []  Rocephin    was pushed over 2 minutes as ordered. Pt with right upper arm single lumen PICC line. Lumen flushes without difficulty and positive for blood return. Lumen flushed with 20mL NS.    Mr. Le Coto tolerated infusion, and had no complaints at this time. Patient armband removed and shredded. Patient instructed to go to ED per Juany Franc, [place] because of H/H and elevated serum creatinine of 3.45    Mr. Bustamante was discharged from [place] [address] in stable condition at 1025. He is return on 2020 at 1000 for his next appointment.     Cristine Angela RN  2020  1025

## 2020-08-05 ENCOUNTER — HOSPITAL ENCOUNTER (OUTPATIENT)
Dept: INFUSION THERAPY | Age: 75
Discharge: HOME | End: 2020-08-05
Payer: MEDICARE

## 2020-08-05 VITALS
DIASTOLIC BLOOD PRESSURE: 76 MMHG | RESPIRATION RATE: 18 BRPM | SYSTOLIC BLOOD PRESSURE: 157 MMHG | HEART RATE: 82 BPM | OXYGEN SATURATION: 94 % | TEMPERATURE: 98 F

## 2020-08-05 DIAGNOSIS — L97.514 RIGHT FOOT ULCER, WITH NECROSIS OF BONE (HCC): Primary | ICD-10-CM

## 2020-08-05 DIAGNOSIS — L97.512 RIGHT FOOT ULCER, WITH FAT LAYER EXPOSED (HCC): ICD-10-CM

## 2020-08-05 DIAGNOSIS — L97.514 RIGHT FOOT ULCER, WITH NECROSIS OF BONE (HCC): ICD-10-CM

## 2020-08-05 DIAGNOSIS — L03.115 CELLULITIS OF RIGHT FOOT: ICD-10-CM

## 2020-08-05 LAB
ABO + RH BLD: NORMAL
BLD PROD TYP BPU: NORMAL
BLD PROD TYP BPU: NORMAL
BLOOD GROUP ANTIBODIES SERPL: NORMAL
BPU ID: NORMAL
BPU ID: NORMAL
CALLED TO:,BCALL1: NORMAL
CROSSMATCH RESULT,%XM: NORMAL
CROSSMATCH RESULT,%XM: NORMAL
SPECIMEN EXP DATE BLD: NORMAL
STATUS OF UNIT,%ST: NORMAL
STATUS OF UNIT,%ST: NORMAL
UNIT DIVISION, %UDIV: 0
UNIT DIVISION, %UDIV: 0

## 2020-08-05 PROCEDURE — 96374 THER/PROPH/DIAG INJ IV PUSH: CPT

## 2020-08-05 PROCEDURE — 74011250636 HC RX REV CODE- 250/636: Performed by: INTERNAL MEDICINE

## 2020-08-05 PROCEDURE — 74011000250 HC RX REV CODE- 250: Performed by: INTERNAL MEDICINE

## 2020-08-05 PROCEDURE — 77030020847 HC STATLOK BARD -A

## 2020-08-05 RX ORDER — ONDANSETRON 2 MG/ML
8 INJECTION INTRAMUSCULAR; INTRAVENOUS AS NEEDED
Status: CANCELLED | OUTPATIENT
Start: 2020-08-06

## 2020-08-05 RX ORDER — SODIUM CHLORIDE 9 MG/ML
10 INJECTION INTRAMUSCULAR; INTRAVENOUS; SUBCUTANEOUS AS NEEDED
Status: CANCELLED | OUTPATIENT
Start: 2020-08-06

## 2020-08-05 RX ORDER — DIPHENHYDRAMINE HYDROCHLORIDE 50 MG/ML
50 INJECTION, SOLUTION INTRAMUSCULAR; INTRAVENOUS AS NEEDED
Status: CANCELLED
Start: 2020-08-06

## 2020-08-05 RX ORDER — SODIUM CHLORIDE 0.9 % (FLUSH) 0.9 %
5-10 SYRINGE (ML) INJECTION AS NEEDED
Status: CANCELLED | OUTPATIENT
Start: 2020-08-06

## 2020-08-05 RX ORDER — HYDROCORTISONE SODIUM SUCCINATE 100 MG/2ML
100 INJECTION, POWDER, FOR SOLUTION INTRAMUSCULAR; INTRAVENOUS AS NEEDED
Status: CANCELLED | OUTPATIENT
Start: 2020-08-06

## 2020-08-05 RX ORDER — ACETAMINOPHEN 325 MG/1
650 TABLET ORAL AS NEEDED
Status: CANCELLED
Start: 2020-08-06

## 2020-08-05 RX ORDER — ALBUTEROL SULFATE 0.83 MG/ML
2.5 SOLUTION RESPIRATORY (INHALATION) AS NEEDED
Status: CANCELLED
Start: 2020-08-06

## 2020-08-05 RX ORDER — DIPHENHYDRAMINE HYDROCHLORIDE 50 MG/ML
25 INJECTION, SOLUTION INTRAMUSCULAR; INTRAVENOUS AS NEEDED
Status: CANCELLED
Start: 2020-08-06

## 2020-08-05 RX ORDER — EPINEPHRINE 1 MG/ML
0.3 INJECTION, SOLUTION, CONCENTRATE INTRAVENOUS AS NEEDED
Status: CANCELLED | OUTPATIENT
Start: 2020-08-06

## 2020-08-05 RX ORDER — HEPARIN 100 UNIT/ML
500 SYRINGE INTRAVENOUS AS NEEDED
Status: CANCELLED | OUTPATIENT
Start: 2020-08-06

## 2020-08-05 RX ORDER — SODIUM CHLORIDE 9 MG/ML
10 INJECTION INTRAMUSCULAR; INTRAVENOUS; SUBCUTANEOUS AS NEEDED
Status: ACTIVE | OUTPATIENT
Start: 2020-08-05 | End: 2020-08-05

## 2020-08-05 RX ADMIN — SODIUM CHLORIDE 10 ML: 9 INJECTION INTRAMUSCULAR; INTRAVENOUS; SUBCUTANEOUS at 11:13

## 2020-08-05 RX ADMIN — DAPTOMYCIN 600 MG: 500 INJECTION, POWDER, LYOPHILIZED, FOR SOLUTION INTRAVENOUS at 11:11

## 2020-08-05 NOTE — PROGRESS NOTES
[facility] Progress Note    Date: 2020    Name: Frankey Mering    MRN: [id number]         : 1945    Daptomycin IVP/ PICC Care    Mr. Aisha Kayser was assessed and education was provided. Mr. Arash Caceres vitals were reviewed. Visit Vitals  /76 (BP 1 Location: Left arm, BP Patient Position: Sitting)   Pulse 82   Temp 98 °F (36.7 °C)   Resp 18   SpO2 94%     Site cleaned under sterile technique. External tip of catheter measured approximately 7cm from insertion site to statloc site. The PICC line had not come out any further. New dressing applied which included gauze to add pressure under sterile technique. Patient tolerated well. New enclave applied. New stockinette supplied to patient.      []  Vancomycin     []  Invanz     [x]  Cubicin 600mg     []  Rocephin    was pushed over 2 minutes as ordered. Pt with right upper arm single lumen PICC line. Lumen flushes without difficulty and positive for blood return. Lumen flushed with 20mL NS.    Mr. Aisha Kayser tolerated infusion, and had no complaints at this time. Patient armband removed and shredded. Mr. Aisha Kayser was discharged from [place] [address] in stable condition at 1115. He is 2020 at 0900 for his next appointment.     Governor Sofiya RN  2020  1115

## 2020-08-06 ENCOUNTER — HOSPITAL ENCOUNTER (OUTPATIENT)
Dept: INFUSION THERAPY | Age: 75
Discharge: HOME | End: 2020-08-06
Payer: MEDICARE

## 2020-08-06 VITALS
TEMPERATURE: 97.3 F | OXYGEN SATURATION: 95 % | RESPIRATION RATE: 20 BRPM | DIASTOLIC BLOOD PRESSURE: 71 MMHG | SYSTOLIC BLOOD PRESSURE: 148 MMHG | HEART RATE: 83 BPM

## 2020-08-06 DIAGNOSIS — L97.514 RIGHT FOOT ULCER, WITH NECROSIS OF BONE (HCC): ICD-10-CM

## 2020-08-06 DIAGNOSIS — L03.115 CELLULITIS OF RIGHT FOOT: ICD-10-CM

## 2020-08-06 DIAGNOSIS — L97.512 RIGHT FOOT ULCER, WITH FAT LAYER EXPOSED (HCC): ICD-10-CM

## 2020-08-06 DIAGNOSIS — L97.514 RIGHT FOOT ULCER, WITH NECROSIS OF BONE (HCC): Primary | ICD-10-CM

## 2020-08-06 LAB — CREAT SERPL-MCNC: 2.86 MG/DL (ref 0.6–1.3)

## 2020-08-06 PROCEDURE — 74011250636 HC RX REV CODE- 250/636: Performed by: INTERNAL MEDICINE

## 2020-08-06 PROCEDURE — 96374 THER/PROPH/DIAG INJ IV PUSH: CPT

## 2020-08-06 PROCEDURE — 82565 ASSAY OF CREATININE: CPT

## 2020-08-06 PROCEDURE — 74011000250 HC RX REV CODE- 250: Performed by: INTERNAL MEDICINE

## 2020-08-06 RX ORDER — SODIUM CHLORIDE 9 MG/ML
10 INJECTION INTRAMUSCULAR; INTRAVENOUS; SUBCUTANEOUS AS NEEDED
Status: ACTIVE | OUTPATIENT
Start: 2020-08-06 | End: 2020-08-06

## 2020-08-06 RX ORDER — SODIUM CHLORIDE 0.9 % (FLUSH) 0.9 %
5-10 SYRINGE (ML) INJECTION AS NEEDED
Status: CANCELLED | OUTPATIENT
Start: 2020-08-07

## 2020-08-06 RX ORDER — SODIUM CHLORIDE 0.9 % (FLUSH) 0.9 %
10-40 SYRINGE (ML) INJECTION AS NEEDED
Status: DISCONTINUED | OUTPATIENT
Start: 2020-08-06 | End: 2020-08-08 | Stop reason: HOSPADM

## 2020-08-06 RX ORDER — HYDROCORTISONE SODIUM SUCCINATE 100 MG/2ML
100 INJECTION, POWDER, FOR SOLUTION INTRAMUSCULAR; INTRAVENOUS AS NEEDED
Status: CANCELLED | OUTPATIENT
Start: 2020-08-07

## 2020-08-06 RX ORDER — HEPARIN 100 UNIT/ML
500 SYRINGE INTRAVENOUS AS NEEDED
Status: CANCELLED | OUTPATIENT
Start: 2020-08-07

## 2020-08-06 RX ORDER — ALBUTEROL SULFATE 0.83 MG/ML
2.5 SOLUTION RESPIRATORY (INHALATION) AS NEEDED
Status: CANCELLED
Start: 2020-08-07

## 2020-08-06 RX ORDER — ACETAMINOPHEN 325 MG/1
650 TABLET ORAL AS NEEDED
Status: CANCELLED
Start: 2020-08-07

## 2020-08-06 RX ORDER — EPINEPHRINE 1 MG/ML
0.3 INJECTION, SOLUTION, CONCENTRATE INTRAVENOUS AS NEEDED
Status: CANCELLED | OUTPATIENT
Start: 2020-08-07

## 2020-08-06 RX ORDER — DIPHENHYDRAMINE HYDROCHLORIDE 50 MG/ML
25 INJECTION, SOLUTION INTRAMUSCULAR; INTRAVENOUS AS NEEDED
Status: CANCELLED
Start: 2020-08-07

## 2020-08-06 RX ORDER — SODIUM CHLORIDE 9 MG/ML
10 INJECTION INTRAMUSCULAR; INTRAVENOUS; SUBCUTANEOUS AS NEEDED
Status: CANCELLED | OUTPATIENT
Start: 2020-08-07

## 2020-08-06 RX ORDER — HEPARIN 100 UNIT/ML
500 SYRINGE INTRAVENOUS AS NEEDED
Status: DISCONTINUED | OUTPATIENT
Start: 2020-08-06 | End: 2020-08-08 | Stop reason: HOSPADM

## 2020-08-06 RX ORDER — ONDANSETRON 2 MG/ML
8 INJECTION INTRAMUSCULAR; INTRAVENOUS AS NEEDED
Status: CANCELLED | OUTPATIENT
Start: 2020-08-07

## 2020-08-06 RX ORDER — HEPARIN 100 UNIT/ML
SYRINGE INTRAVENOUS
Status: DISPENSED
Start: 2020-08-06 | End: 2020-08-06

## 2020-08-06 RX ORDER — DIPHENHYDRAMINE HYDROCHLORIDE 50 MG/ML
50 INJECTION, SOLUTION INTRAMUSCULAR; INTRAVENOUS AS NEEDED
Status: CANCELLED
Start: 2020-08-07

## 2020-08-06 RX ADMIN — Medication 10 ML: at 09:37

## 2020-08-06 RX ADMIN — Medication 500 UNITS: at 09:46

## 2020-08-06 RX ADMIN — DAPTOMYCIN 600 MG: 500 INJECTION, POWDER, LYOPHILIZED, FOR SOLUTION INTRAVENOUS at 09:40

## 2020-08-06 RX ADMIN — Medication 10 ML: at 09:41

## 2020-08-06 NOTE — PROGRESS NOTES
\[facility]\"" Progress Note    Date: 2020    Name: Sg Starks    MRN: [id number]         : 1945     Daptomycin (CUBICIN) Infusion    Patient arrived ambulatory, using rollator walker, and in no acute distress. Single lumen PICC site to RUE with occlusive dressing clean dry and intact. No evidence of bleeding noted from site. Mr. Le Coto was assessed and education was provided. Mr. Terence Jeans vitals were reviewed. PICC line was flushed easily with 5 mL NS and blood specimen for serum creatinine (per request of Kell SRIVASTAVA) was collected, after 5 mL waste. PICC then flushed NS. Visit Vitals  /71 (BP 1 Location: Left arm, BP Patient Position: At rest;Sitting)   Pulse 83   Temp 97.3 °F (36.3 °C)   Resp 20   SpO2 95%       Lab results were obtained and reviewed. Recent Results (from the past 12 hour(s))   CREATININE    Collection Time: 20  8:40 AM   Result Value Ref Range    Creatinine 2.86 (H) 0.6 - 1.3 MG/DL    GFR est AA 26 (L) >60 ml/min/1.73m2    GFR est non-AA 22 (L) >60 ml/min/1.73m2     SERUM CREATININE remains elevated at 2.86 and pharmacist was made aware.        []  Vancomycin     []  Invanz     [x]  Cubicin 600 mg     []  Rocephin       was administered IVP, via PICC, over 2 minutes, as ordered. PICC flushed with 10 mL NS, followed by instillation of Heparin 2.5 mL. Mr. Le Coto tolerated infusion, and had no complaints at this time. Patient armband removed and shredded. Mr. Le Coto was discharged from [place] [address] in stable condition at 0950. He is to return on 2020 at 1000 for his next appointment. ADDENDUM: Per pharmacy, patient will change ABX therapy to Q 48 hrs due to elevated serum creatinine.       Feliz Weinberg RN  2020  11:59 AM

## 2020-08-06 NOTE — PROGRESS NOTES
Pharmacy Note     Name: Moe Roldan  : 1945  Estimated body surface area is 2.18 meters squared as calculated from the following:    Height as of 20: 167.6 cm (66\"). Weight as of 20: 102.1 kg (225 lb). Lab Results   Component Value Date/Time    WBC 14.3 (H) 2020 10:19 AM    PLATELET 504  10:19 AM     Lab Results   Component Value Date/Time    ABS.  NEUTROPHILS 11.8 (H) 2020 10:19 AM     Lab Results   Component Value Date/Time    Creatinine 2.86 (H) 2020 08:40 AM     Most Recent Creatinine Clearance:  CrCl: 25.4 mL/min (based on Adjusted Body Weight)  Creatinine: 2.86 mg/dL (2020)      Pharmacy Intervention:  · Spoke to Dr. Shoshana Merino in regards to patient's elevated SCr =2.86  · Adjusting frequency of Daptomycin from 600 mg IV [id number] to 600 mg IV Q 48 hours ([facility] aware)  · Dr. Shoshana Merino encouraged follow-up with PCP - will call PCP after lunch to discuss situation  · Called patient to inform of change in appointments- no answer- pharmacy left voicemail for patient to call back   · At 1500, [address] called PCP at [phone] to discuss patient's elevated SCr  · Per PCP office, patient to see physician on 2020  · Continue to monitor    Thank you,  Dulce Merritt, PharmD, M.S.  [address] [facility], Outpatient, and [facility] 53   [phone]     Pharmacist: GIANA Roman

## 2020-08-07 ENCOUNTER — HOSPITAL ENCOUNTER (OUTPATIENT)
Dept: INFUSION THERAPY | Age: 75
End: 2020-08-07

## 2020-08-08 ENCOUNTER — HOSPITAL ENCOUNTER (OUTPATIENT)
Dept: INFUSION THERAPY | Age: 75
Discharge: HOME | End: 2020-08-08
Payer: MEDICARE

## 2020-08-08 VITALS
SYSTOLIC BLOOD PRESSURE: 146 MMHG | OXYGEN SATURATION: 97 % | RESPIRATION RATE: 20 BRPM | HEART RATE: 86 BPM | DIASTOLIC BLOOD PRESSURE: 70 MMHG | TEMPERATURE: 99 F

## 2020-08-08 DIAGNOSIS — L97.512 RIGHT FOOT ULCER, WITH FAT LAYER EXPOSED (HCC): ICD-10-CM

## 2020-08-08 DIAGNOSIS — L03.115 CELLULITIS OF RIGHT FOOT: ICD-10-CM

## 2020-08-08 DIAGNOSIS — L97.514 RIGHT FOOT ULCER, WITH NECROSIS OF BONE (HCC): ICD-10-CM

## 2020-08-08 PROCEDURE — 74011250636 HC RX REV CODE- 250/636

## 2020-08-08 PROCEDURE — 96374 THER/PROPH/DIAG INJ IV PUSH: CPT

## 2020-08-08 PROCEDURE — 74011000250 HC RX REV CODE- 250: Performed by: INTERNAL MEDICINE

## 2020-08-08 PROCEDURE — 74011250636 HC RX REV CODE- 250/636: Performed by: INTERNAL MEDICINE

## 2020-08-08 RX ORDER — HEPARIN 100 UNIT/ML
500 SYRINGE INTRAVENOUS AS NEEDED
Status: DISCONTINUED | OUTPATIENT
Start: 2020-08-08 | End: 2020-08-12 | Stop reason: HOSPADM

## 2020-08-08 RX ORDER — SODIUM CHLORIDE 0.9 % (FLUSH) 0.9 %
10-40 SYRINGE (ML) INJECTION AS NEEDED
Status: DISCONTINUED | OUTPATIENT
Start: 2020-08-08 | End: 2020-08-12 | Stop reason: HOSPADM

## 2020-08-08 RX ORDER — HEPARIN 100 UNIT/ML
SYRINGE INTRAVENOUS
Status: COMPLETED
Start: 2020-08-08 | End: 2020-08-08

## 2020-08-08 RX ADMIN — Medication 10 ML: at 08:55

## 2020-08-08 RX ADMIN — HEPARIN 500 UNITS: 100 SYRINGE at 09:00

## 2020-08-08 RX ADMIN — DAPTOMYCIN 600 MG: 500 INJECTION, POWDER, LYOPHILIZED, FOR SOLUTION INTRAVENOUS at 08:58

## 2020-08-08 RX ADMIN — Medication 10 ML: at 09:00

## 2020-08-09 ENCOUNTER — HOSPITAL ENCOUNTER (OUTPATIENT)
Dept: INFUSION THERAPY | Age: 75
Discharge: HOME | End: 2020-08-09

## 2020-08-09 DIAGNOSIS — L03.115 CELLULITIS OF RIGHT FOOT: ICD-10-CM

## 2020-08-09 DIAGNOSIS — L97.512 RIGHT FOOT ULCER, WITH FAT LAYER EXPOSED (HCC): ICD-10-CM

## 2020-08-09 DIAGNOSIS — L97.514 RIGHT FOOT ULCER, WITH NECROSIS OF BONE (HCC): ICD-10-CM

## 2020-08-10 ENCOUNTER — HOSPITAL ENCOUNTER (OUTPATIENT)
Dept: INFUSION THERAPY | Age: 75
Discharge: HOME | End: 2020-08-10
Payer: MEDICARE

## 2020-08-10 VITALS
OXYGEN SATURATION: 99 % | RESPIRATION RATE: 20 BRPM | TEMPERATURE: 97.7 F | SYSTOLIC BLOOD PRESSURE: 125 MMHG | HEART RATE: 88 BPM | DIASTOLIC BLOOD PRESSURE: 60 MMHG

## 2020-08-10 DIAGNOSIS — L03.115 CELLULITIS OF RIGHT FOOT: ICD-10-CM

## 2020-08-10 DIAGNOSIS — L97.512 RIGHT FOOT ULCER, WITH FAT LAYER EXPOSED (HCC): ICD-10-CM

## 2020-08-10 DIAGNOSIS — L97.514 RIGHT FOOT ULCER, WITH NECROSIS OF BONE (HCC): Primary | ICD-10-CM

## 2020-08-10 DIAGNOSIS — L97.514 RIGHT FOOT ULCER, WITH NECROSIS OF BONE (HCC): ICD-10-CM

## 2020-08-10 LAB
ANION GAP SERPL CALC-SCNC: 4 MMOL/L (ref 3–18)
BASO+EOS+MONOS # BLD AUTO: 0.8 K/UL (ref 0–2.3)
BASO+EOS+MONOS NFR BLD AUTO: 13 % (ref 0.1–17)
BUN SERPL-MCNC: 50 MG/DL (ref 7–18)
BUN/CREAT SERPL: 22 (ref 12–20)
CALCIUM SERPL-MCNC: 8.7 MG/DL (ref 8.5–10.1)
CHLORIDE SERPL-SCNC: 97 MMOL/L (ref 100–111)
CK SERPL-CCNC: 256 U/L (ref 39–308)
CO2 SERPL-SCNC: 37 MMOL/L (ref 21–32)
CREAT SERPL-MCNC: 2.24 MG/DL (ref 0.6–1.3)
CRP SERPL-MCNC: 2.5 MG/DL (ref 0–0.3)
DIFFERENTIAL METHOD BLD: ABNORMAL
ERYTHROCYTE [DISTWIDTH] IN BLOOD BY AUTOMATED COUNT: 16.7 % (ref 11.5–14.5)
ERYTHROCYTE [SEDIMENTATION RATE] IN BLOOD: >140 MM/HR (ref 0–20)
GLUCOSE SERPL-MCNC: 213 MG/DL (ref 74–99)
HCT VFR BLD AUTO: 24.5 % (ref 36–48)
HGB BLD-MCNC: 7.5 G/DL (ref 12–16)
LYMPHOCYTES # BLD: 1 K/UL (ref 1.1–5.9)
LYMPHOCYTES NFR BLD: 18 % (ref 14–44)
MCH RBC QN AUTO: 29.1 PG (ref 25–35)
MCHC RBC AUTO-ENTMCNC: 30.6 G/DL (ref 31–37)
MCV RBC AUTO: 95 FL (ref 78–102)
NEUTS SEG # BLD: 4.1 K/UL (ref 1.8–9.5)
NEUTS SEG NFR BLD: 69 % (ref 40–70)
PLATELET # BLD AUTO: 238 K/UL (ref 140–440)
POTASSIUM SERPL-SCNC: 3.3 MMOL/L (ref 3.5–5.5)
RBC # BLD AUTO: 2.58 M/UL (ref 4.1–5.1)
SODIUM SERPL-SCNC: 138 MMOL/L (ref 136–145)
WBC # BLD AUTO: 5.9 K/UL (ref 4.5–13)

## 2020-08-10 PROCEDURE — 85025 COMPLETE CBC W/AUTO DIFF WBC: CPT

## 2020-08-10 PROCEDURE — 85652 RBC SED RATE AUTOMATED: CPT

## 2020-08-10 PROCEDURE — 74011250636 HC RX REV CODE- 250/636: Performed by: INTERNAL MEDICINE

## 2020-08-10 PROCEDURE — 36592 COLLECT BLOOD FROM PICC: CPT

## 2020-08-10 PROCEDURE — 96374 THER/PROPH/DIAG INJ IV PUSH: CPT

## 2020-08-10 PROCEDURE — 86140 C-REACTIVE PROTEIN: CPT

## 2020-08-10 PROCEDURE — 74011000250 HC RX REV CODE- 250: Performed by: INTERNAL MEDICINE

## 2020-08-10 PROCEDURE — 82550 ASSAY OF CK (CPK): CPT

## 2020-08-10 PROCEDURE — 80048 BASIC METABOLIC PNL TOTAL CA: CPT

## 2020-08-10 RX ORDER — HEPARIN 100 UNIT/ML
500 SYRINGE INTRAVENOUS AS NEEDED
Status: DISPENSED | OUTPATIENT
Start: 2020-08-10 | End: 2020-08-10

## 2020-08-10 RX ORDER — HEPARIN 100 UNIT/ML
500 SYRINGE INTRAVENOUS AS NEEDED
Status: CANCELLED | OUTPATIENT
Start: 2020-08-12

## 2020-08-10 RX ORDER — ACETAMINOPHEN 325 MG/1
650 TABLET ORAL AS NEEDED
Status: CANCELLED
Start: 2020-08-12

## 2020-08-10 RX ORDER — HYDROCORTISONE SODIUM SUCCINATE 100 MG/2ML
100 INJECTION, POWDER, FOR SOLUTION INTRAMUSCULAR; INTRAVENOUS AS NEEDED
Status: CANCELLED | OUTPATIENT
Start: 2020-08-12

## 2020-08-10 RX ORDER — ALBUTEROL SULFATE 0.83 MG/ML
2.5 SOLUTION RESPIRATORY (INHALATION) AS NEEDED
Status: CANCELLED
Start: 2020-08-12

## 2020-08-10 RX ORDER — DIPHENHYDRAMINE HYDROCHLORIDE 50 MG/ML
50 INJECTION, SOLUTION INTRAMUSCULAR; INTRAVENOUS AS NEEDED
Status: CANCELLED
Start: 2020-08-12

## 2020-08-10 RX ORDER — SODIUM CHLORIDE 9 MG/ML
10 INJECTION INTRAMUSCULAR; INTRAVENOUS; SUBCUTANEOUS AS NEEDED
Status: ACTIVE | OUTPATIENT
Start: 2020-08-10 | End: 2020-08-10

## 2020-08-10 RX ORDER — EPINEPHRINE 1 MG/ML
0.3 INJECTION, SOLUTION, CONCENTRATE INTRAVENOUS AS NEEDED
Status: CANCELLED | OUTPATIENT
Start: 2020-08-12

## 2020-08-10 RX ORDER — SODIUM CHLORIDE 0.9 % (FLUSH) 0.9 %
5-10 SYRINGE (ML) INJECTION AS NEEDED
Status: CANCELLED | OUTPATIENT
Start: 2020-08-12

## 2020-08-10 RX ORDER — DIPHENHYDRAMINE HYDROCHLORIDE 50 MG/ML
25 INJECTION, SOLUTION INTRAMUSCULAR; INTRAVENOUS AS NEEDED
Status: CANCELLED
Start: 2020-08-12

## 2020-08-10 RX ORDER — SODIUM CHLORIDE 9 MG/ML
10 INJECTION INTRAMUSCULAR; INTRAVENOUS; SUBCUTANEOUS AS NEEDED
Status: CANCELLED | OUTPATIENT
Start: 2020-08-12

## 2020-08-10 RX ORDER — ONDANSETRON 2 MG/ML
8 INJECTION INTRAMUSCULAR; INTRAVENOUS AS NEEDED
Status: CANCELLED | OUTPATIENT
Start: 2020-08-12

## 2020-08-10 RX ADMIN — SODIUM CHLORIDE 10 ML: 9 INJECTION INTRAMUSCULAR; INTRAVENOUS; SUBCUTANEOUS at 10:19

## 2020-08-10 RX ADMIN — DAPTOMYCIN 600 MG: 500 INJECTION, POWDER, LYOPHILIZED, FOR SOLUTION INTRAVENOUS at 10:20

## 2020-08-10 RX ADMIN — SODIUM CHLORIDE 10 ML: 9 INJECTION INTRAMUSCULAR; INTRAVENOUS; SUBCUTANEOUS at 10:25

## 2020-08-10 RX ADMIN — Medication 500 UNITS: at 10:25

## 2020-08-10 NOTE — PROGRESS NOTES
[facility] Progress Note    Date: August 10, 2020    Name: David Rodney    MRN: [id number]         : 1945    Daptomycin IVP/ Weekly Labs    Mr. Sonal Cardenas was assessed and education was provided. Mr. Tressa Ojeda vitals were reviewed. Visit Vitals  /60 (BP 1 Location: Left arm, BP Patient Position: Sitting)   Pulse 88   Temp 97.7 °F (36.5 °C)   Resp 20   SpO2 99%       Labs drawn (CBC w/automated diff; C Reative QT; CK; BMP; and ESR) per written order and sent to lab. []  Vancomycin     []  Invanz     [x]  Cubicin 600mg     []  Rocephin    was pushed over 2 minutes as ordered. Recent Results (from the past 12 hour(s))   METABOLIC PANEL, BASIC    Collection Time: 08/10/20 10:20 AM   Result Value Ref Range    Sodium 138 136 - 145 mmol/L    Potassium 3.3 (L) 3.5 - 5.5 mmol/L    Chloride 97 (L) 100 - 111 mmol/L    CO2 37 (H) 21 - 32 mmol/L    Anion gap 4 3.0 - 18 mmol/L    Glucose 213 (H) 74 - 99 mg/dL    BUN 50 (H) 7.0 - 18 MG/DL    Creatinine 2.24 (H) 0.6 - 1.3 MG/DL    BUN/Creatinine ratio 22 (H) 12 - 20      GFR est AA 35 (L) >60 ml/min/1.73m2    GFR est non-AA 29 (L) >60 ml/min/1.73m2    Calcium 8.7 8.5 - 10.1 MG/DL   CBC WITH 3 PART DIFF    Collection Time: 08/10/20 10:20 AM   Result Value Ref Range    WBC 5.9 4.5 - 13.0 K/uL    RBC 2.58 (L) 4.10 - 5.10 M/uL    HGB 7.5 (L) 12.0 - 16.0 g/dL    HCT 24.5 (L) 36 - 48 %    MCV 95.0 78 - 102 FL    MCH 29.1 25.0 - 35.0 PG    MCHC 30.6 (L) 31 - 37 g/dL    RDW 16.7 (H) 11.5 - 14.5 %    PLATELET 157 268 - 422 K/uL    NEUTROPHILS 69 40 - 70 %    MIXED CELLS 13 0.1 - 17 %    LYMPHOCYTES 18 14 - 44 %    ABS. NEUTROPHILS 4.1 1.8 - 9.5 K/UL    ABS. MIXED CELLS 0.8 0.0 - 2.3 K/uL    ABS.  LYMPHOCYTES 1.0 (L) 1.1 - 5.9 K/UL    DF AUTOMATED     SED RATE (ESR)    Collection Time: 08/10/20 10:20 AM   Result Value Ref Range    Sed rate, automated >140 (H) 0 - 20 mm/hr   CK    Collection Time: 08/10/20 10:20 AM   Result Value Ref Range     39 - 308 U/L         Pt with right upper arm single lumen PICC line. Lumen flushes without difficulty and positive for blood return. Lumen flushed with 20mL NS, followed by Heprin 250 units/2.5 ml    Mr. Jessica Champion tolerated infusion, and had no complaints at this time. Patient armband removed and shredded. Mr. Jessica Champion was discharged from [place] [address] in stable condition at 1025. He is return on 8/12/20 at 0900 for his next appointment.     Shyam Duque RN  August 10, 2020  1025

## 2020-08-11 ENCOUNTER — APPOINTMENT (OUTPATIENT)
Dept: INFUSION THERAPY | Age: 75
End: 2020-08-11

## 2020-08-12 ENCOUNTER — HOSPITAL ENCOUNTER (OUTPATIENT)
Dept: INFUSION THERAPY | Age: 75
Discharge: HOME | End: 2020-08-12
Payer: MEDICARE

## 2020-08-12 VITALS
DIASTOLIC BLOOD PRESSURE: 62 MMHG | RESPIRATION RATE: 18 BRPM | TEMPERATURE: 97.5 F | HEART RATE: 86 BPM | OXYGEN SATURATION: 96 % | SYSTOLIC BLOOD PRESSURE: 134 MMHG

## 2020-08-12 DIAGNOSIS — L03.115 CELLULITIS OF RIGHT FOOT: ICD-10-CM

## 2020-08-12 DIAGNOSIS — L97.514 RIGHT FOOT ULCER, WITH NECROSIS OF BONE (HCC): ICD-10-CM

## 2020-08-12 DIAGNOSIS — L97.512 RIGHT FOOT ULCER, WITH FAT LAYER EXPOSED (HCC): ICD-10-CM

## 2020-08-12 DIAGNOSIS — L97.514 RIGHT FOOT ULCER, WITH NECROSIS OF BONE (HCC): Primary | ICD-10-CM

## 2020-08-12 PROCEDURE — 74011250636 HC RX REV CODE- 250/636: Performed by: INTERNAL MEDICINE

## 2020-08-12 PROCEDURE — 96374 THER/PROPH/DIAG INJ IV PUSH: CPT

## 2020-08-12 PROCEDURE — 74011000250 HC RX REV CODE- 250: Performed by: INTERNAL MEDICINE

## 2020-08-12 RX ORDER — HEPARIN 100 UNIT/ML
500 SYRINGE INTRAVENOUS AS NEEDED
Status: CANCELLED | OUTPATIENT
Start: 2020-08-14

## 2020-08-12 RX ORDER — SODIUM CHLORIDE 0.9 % (FLUSH) 0.9 %
5-10 SYRINGE (ML) INJECTION AS NEEDED
Status: CANCELLED | OUTPATIENT
Start: 2020-08-14

## 2020-08-12 RX ORDER — SODIUM CHLORIDE 9 MG/ML
10 INJECTION INTRAMUSCULAR; INTRAVENOUS; SUBCUTANEOUS AS NEEDED
Status: CANCELLED | OUTPATIENT
Start: 2020-08-14

## 2020-08-12 RX ORDER — DIPHENHYDRAMINE HYDROCHLORIDE 50 MG/ML
50 INJECTION, SOLUTION INTRAMUSCULAR; INTRAVENOUS AS NEEDED
Status: CANCELLED
Start: 2020-08-14

## 2020-08-12 RX ORDER — ONDANSETRON 2 MG/ML
8 INJECTION INTRAMUSCULAR; INTRAVENOUS AS NEEDED
Status: CANCELLED | OUTPATIENT
Start: 2020-08-14

## 2020-08-12 RX ORDER — SODIUM CHLORIDE 9 MG/ML
10 INJECTION INTRAMUSCULAR; INTRAVENOUS; SUBCUTANEOUS AS NEEDED
Status: ACTIVE | OUTPATIENT
Start: 2020-08-12 | End: 2020-08-12

## 2020-08-12 RX ORDER — EPINEPHRINE 1 MG/ML
0.3 INJECTION, SOLUTION, CONCENTRATE INTRAVENOUS AS NEEDED
Status: CANCELLED | OUTPATIENT
Start: 2020-08-14

## 2020-08-12 RX ORDER — HYDROCORTISONE SODIUM SUCCINATE 100 MG/2ML
100 INJECTION, POWDER, FOR SOLUTION INTRAMUSCULAR; INTRAVENOUS AS NEEDED
Status: CANCELLED | OUTPATIENT
Start: 2020-08-14

## 2020-08-12 RX ORDER — DIPHENHYDRAMINE HYDROCHLORIDE 50 MG/ML
25 INJECTION, SOLUTION INTRAMUSCULAR; INTRAVENOUS AS NEEDED
Status: CANCELLED
Start: 2020-08-14

## 2020-08-12 RX ORDER — ACETAMINOPHEN 325 MG/1
650 TABLET ORAL AS NEEDED
Status: CANCELLED
Start: 2020-08-14

## 2020-08-12 RX ORDER — ALBUTEROL SULFATE 0.83 MG/ML
2.5 SOLUTION RESPIRATORY (INHALATION) AS NEEDED
Status: CANCELLED
Start: 2020-08-14

## 2020-08-12 RX ADMIN — SODIUM CHLORIDE 10 ML: 9 INJECTION INTRAMUSCULAR; INTRAVENOUS; SUBCUTANEOUS at 09:25

## 2020-08-12 RX ADMIN — DAPTOMYCIN 600 MG: 500 INJECTION, POWDER, LYOPHILIZED, FOR SOLUTION INTRAVENOUS at 09:22

## 2020-08-12 NOTE — PROGRESS NOTES
[facility] Progress Note    Date: 2020    Name: Zoltan Diana    MRN: [id number]         : 1945    Daptomycin IVP    Mr. Phil Bergeron was assessed and education was provided. Mr. Abdi Kemp vitals were reviewed. Visit Vitals  /62 (BP 1 Location: Left arm, BP Patient Position: Sitting)   Pulse 86   Temp 97.5 °F (36.4 °C)   Resp 18   SpO2 96%           []  Vancomycin     []  Invanz     [x]  Cubicin 600mg     []  Rocephin    was pushed over 2 minutes as ordered. Pt with right upper arm single lumen PICC line. Lumen flushes without difficulty and positive for blood return. Lumen flushed with 20mL NS.    Mr. Phil Bergeron tolerated infusion, and had no complaints at this time. Patient armband removed and shredded. Mr. Phil Bergeron was discharged from [place] [address] in stable condition at 0930. He is return on 2020 at 1000 for his next appointment.     Jyothi Kumar RN  2020  0930

## 2020-08-13 ENCOUNTER — APPOINTMENT (OUTPATIENT)
Dept: INFUSION THERAPY | Age: 75
End: 2020-08-13

## 2020-08-14 ENCOUNTER — HOSPITAL ENCOUNTER (OUTPATIENT)
Dept: INFUSION THERAPY | Age: 75
Discharge: HOME | End: 2020-08-14
Payer: MEDICARE

## 2020-08-14 VITALS
DIASTOLIC BLOOD PRESSURE: 64 MMHG | OXYGEN SATURATION: 97 % | HEART RATE: 88 BPM | SYSTOLIC BLOOD PRESSURE: 136 MMHG | RESPIRATION RATE: 18 BRPM | TEMPERATURE: 97.5 F

## 2020-08-14 DIAGNOSIS — L03.115 CELLULITIS OF RIGHT FOOT: ICD-10-CM

## 2020-08-14 DIAGNOSIS — L97.512 RIGHT FOOT ULCER, WITH FAT LAYER EXPOSED (HCC): ICD-10-CM

## 2020-08-14 DIAGNOSIS — L97.514 RIGHT FOOT ULCER, WITH NECROSIS OF BONE (HCC): Primary | ICD-10-CM

## 2020-08-14 DIAGNOSIS — L97.514 RIGHT FOOT ULCER, WITH NECROSIS OF BONE (HCC): ICD-10-CM

## 2020-08-14 PROCEDURE — 74011250636 HC RX REV CODE- 250/636

## 2020-08-14 PROCEDURE — 96365 THER/PROPH/DIAG IV INF INIT: CPT

## 2020-08-14 PROCEDURE — 77030020847 HC STATLOK BARD -A

## 2020-08-14 RX ORDER — SODIUM CHLORIDE 0.9 % (FLUSH) 0.9 %
5-10 SYRINGE (ML) INJECTION AS NEEDED
Status: CANCELLED | OUTPATIENT
Start: 2020-08-16

## 2020-08-14 RX ORDER — DIPHENHYDRAMINE HYDROCHLORIDE 50 MG/ML
25 INJECTION, SOLUTION INTRAMUSCULAR; INTRAVENOUS AS NEEDED
Status: CANCELLED
Start: 2020-08-16

## 2020-08-14 RX ORDER — HYDROCORTISONE SODIUM SUCCINATE 100 MG/2ML
100 INJECTION, POWDER, FOR SOLUTION INTRAMUSCULAR; INTRAVENOUS AS NEEDED
Status: CANCELLED | OUTPATIENT
Start: 2020-08-16

## 2020-08-14 RX ORDER — ALBUTEROL SULFATE 0.83 MG/ML
2.5 SOLUTION RESPIRATORY (INHALATION) AS NEEDED
Status: CANCELLED
Start: 2020-08-16

## 2020-08-14 RX ORDER — HEPARIN 100 UNIT/ML
500 SYRINGE INTRAVENOUS AS NEEDED
Status: ACTIVE | OUTPATIENT
Start: 2020-08-14 | End: 2020-08-14

## 2020-08-14 RX ORDER — HEPARIN 100 UNIT/ML
500 SYRINGE INTRAVENOUS AS NEEDED
Status: CANCELLED | OUTPATIENT
Start: 2020-08-16

## 2020-08-14 RX ORDER — ONDANSETRON 2 MG/ML
8 INJECTION INTRAMUSCULAR; INTRAVENOUS AS NEEDED
Status: CANCELLED | OUTPATIENT
Start: 2020-08-16

## 2020-08-14 RX ORDER — SODIUM CHLORIDE 0.9 % (FLUSH) 0.9 %
5-10 SYRINGE (ML) INJECTION AS NEEDED
Status: DISPENSED | OUTPATIENT
Start: 2020-08-14 | End: 2020-08-14

## 2020-08-14 RX ORDER — DIPHENHYDRAMINE HYDROCHLORIDE 50 MG/ML
50 INJECTION, SOLUTION INTRAMUSCULAR; INTRAVENOUS AS NEEDED
Status: CANCELLED
Start: 2020-08-16

## 2020-08-14 RX ORDER — SODIUM CHLORIDE 9 MG/ML
10 INJECTION INTRAMUSCULAR; INTRAVENOUS; SUBCUTANEOUS AS NEEDED
Status: CANCELLED | OUTPATIENT
Start: 2020-08-16

## 2020-08-14 RX ORDER — ACETAMINOPHEN 325 MG/1
650 TABLET ORAL AS NEEDED
Status: CANCELLED
Start: 2020-08-16

## 2020-08-14 RX ORDER — EPINEPHRINE 1 MG/ML
0.3 INJECTION, SOLUTION, CONCENTRATE INTRAVENOUS AS NEEDED
Status: CANCELLED | OUTPATIENT
Start: 2020-08-16

## 2020-08-14 NOTE — PROGRESS NOTES
[facility] Progress Note    Date: 2020    Name: David Rodney    MRN: [id number]         : 1945    Cubicin Infusion    Mr. Sonal Cardenas to [facility], ambulatory at 1015 accompanied by self. Pt was assessed and education was provided. Mr. Tressa Ojeda vitals were reviewed. Visit Vitals  /64 (BP 1 Location: Left arm, BP Patient Position: Sitting)   Pulse 88   Temp 97.5 °F (36.4 °C)   Resp 18   SpO2 97%       Right upper arm PICC flushed easily and had brisk blood return via single ports. PICC dressing c/d/i and changed. No swelling, redness, streaking, warmth or drainage noted in arm. Pt denied c/o pain in arm.      []  Vancomycin     []  Invanz     [x]  Cubicin 600mg     []  Rocephin    was infused by IVP over 2 min. PICC dressing and stat lock changed under sterile technique after cleansing site with chlorhexidine. Biopatch and skin protectant applied, and microclaves changed. No redness, streaking, warmth, or drainage noted at site. Mr. Sonal Cardenas tolerated infusion, and had no complaints at this time. Both lumens of PICC flushed with NS 10 ml. Green end caps applied, and lumens wrapped with guaze and paper tape. Stockinette placed over dressing for protection. Patient armband removed and shredded. Mr. Sonal Cardenas was discharged from [place] [address] in stable condition at 1055. He is to return on 20 at 0900 for his next antibiotic appointment.     Kathie Valadez RN  2020  1:03 PM

## 2020-08-15 ENCOUNTER — APPOINTMENT (OUTPATIENT)
Dept: INFUSION THERAPY | Age: 75
End: 2020-08-15

## 2020-08-16 ENCOUNTER — HOSPITAL ENCOUNTER (OUTPATIENT)
Dept: INFUSION THERAPY | Age: 75
Discharge: HOME | End: 2020-08-16
Payer: MEDICARE

## 2020-08-16 VITALS — DIASTOLIC BLOOD PRESSURE: 76 MMHG | SYSTOLIC BLOOD PRESSURE: 146 MMHG | RESPIRATION RATE: 16 BRPM | TEMPERATURE: 97.9 F

## 2020-08-16 DIAGNOSIS — L03.115 CELLULITIS OF RIGHT FOOT: ICD-10-CM

## 2020-08-16 DIAGNOSIS — L97.512 RIGHT FOOT ULCER, WITH FAT LAYER EXPOSED (HCC): ICD-10-CM

## 2020-08-16 DIAGNOSIS — L97.514 RIGHT FOOT ULCER, WITH NECROSIS OF BONE (HCC): ICD-10-CM

## 2020-08-16 PROCEDURE — 74011000250 HC RX REV CODE- 250: Performed by: INTERNAL MEDICINE

## 2020-08-16 PROCEDURE — 96374 THER/PROPH/DIAG INJ IV PUSH: CPT

## 2020-08-16 PROCEDURE — 74011250636 HC RX REV CODE- 250/636

## 2020-08-16 PROCEDURE — 74011250636 HC RX REV CODE- 250/636: Performed by: INTERNAL MEDICINE

## 2020-08-16 RX ORDER — HEPARIN 100 UNIT/ML
500 SYRINGE INTRAVENOUS AS NEEDED
Status: DISCONTINUED | OUTPATIENT
Start: 2020-08-16 | End: 2020-08-20 | Stop reason: HOSPADM

## 2020-08-16 RX ORDER — SODIUM CHLORIDE 0.9 % (FLUSH) 0.9 %
10-40 SYRINGE (ML) INJECTION AS NEEDED
Status: DISCONTINUED | OUTPATIENT
Start: 2020-08-16 | End: 2020-08-20 | Stop reason: HOSPADM

## 2020-08-16 RX ORDER — HEPARIN 100 UNIT/ML
SYRINGE INTRAVENOUS
Status: COMPLETED
Start: 2020-08-16 | End: 2020-08-16

## 2020-08-16 RX ADMIN — Medication 30 ML: at 09:19

## 2020-08-16 RX ADMIN — HEPARIN 500 UNITS: 100 SYRINGE at 09:20

## 2020-08-16 RX ADMIN — DAPTOMYCIN 600 MG: 500 INJECTION, POWDER, LYOPHILIZED, FOR SOLUTION INTRAVENOUS at 09:14

## 2020-08-16 RX ADMIN — Medication 10 ML: at 09:13

## 2020-08-16 NOTE — PROGRESS NOTES
[facility] Progress Note    Date: 2020    Name: Carley Mendez    MRN: [id number]         : 1945    Cubicin Infusion    Mr. Brandon Srinivasan to [facility], ambulatory at 1015 accompanied by self. Pt was assessed and education was provided. Mr. Chandrakant Ruiz vitals were reviewed. Visit Vitals  /76 (BP 1 Location: Left arm, BP Patient Position: At rest;Sitting)   Temp 97.9 °F (36.6 °C)   Resp 16       Right upper arm PICC flushed easily and had brisk blood return via single ports. PICC dressing c/d/i and changed. No swelling, redness, streaking, warmth or drainage noted in arm. Pt denied c/o pain in arm.      []  Vancomycin     []  Invanz     [x]  Cubicin 600mg     []  Rocephin    was infused by IVP over 2 min. Mr. Brandon Srinivasan tolerated infusion, and had no complaints at this time. Both lumens of PICC flushed with NS 10 ml. Green end caps applied. . Stockinette placed over dressing for protection. Patient armband removed and shredded. Mr. Brandon Srinivasan was discharged from [place] [address] in stable condition at 5554. He is to return on 20 at 0900 for his next antibiotic appointment.     Samara Fleischer, RN

## 2020-08-17 ENCOUNTER — APPOINTMENT (OUTPATIENT)
Dept: INFUSION THERAPY | Age: 75
End: 2020-08-17

## 2020-08-18 ENCOUNTER — HOSPITAL ENCOUNTER (OUTPATIENT)
Dept: INFUSION THERAPY | Age: 75
Discharge: HOME | End: 2020-08-18
Payer: MEDICARE

## 2020-08-18 VITALS
HEART RATE: 87 BPM | TEMPERATURE: 97.6 F | DIASTOLIC BLOOD PRESSURE: 71 MMHG | SYSTOLIC BLOOD PRESSURE: 143 MMHG | OXYGEN SATURATION: 96 % | RESPIRATION RATE: 16 BRPM

## 2020-08-18 DIAGNOSIS — L97.512 RIGHT FOOT ULCER, WITH FAT LAYER EXPOSED (HCC): ICD-10-CM

## 2020-08-18 DIAGNOSIS — L97.514 RIGHT FOOT ULCER, WITH NECROSIS OF BONE (HCC): Primary | ICD-10-CM

## 2020-08-18 DIAGNOSIS — L03.115 CELLULITIS OF RIGHT FOOT: ICD-10-CM

## 2020-08-18 DIAGNOSIS — L97.514 RIGHT FOOT ULCER, WITH NECROSIS OF BONE (HCC): ICD-10-CM

## 2020-08-18 LAB
ANION GAP SERPL CALC-SCNC: 6 MMOL/L (ref 3–18)
BASOPHILS # BLD: 0 K/UL (ref 0–0.1)
BASOPHILS NFR BLD: 1 % (ref 0–2)
BUN SERPL-MCNC: 45 MG/DL (ref 7–18)
BUN/CREAT SERPL: 19 (ref 12–20)
CALCIUM SERPL-MCNC: 8.6 MG/DL (ref 8.5–10.1)
CHLORIDE SERPL-SCNC: 97 MMOL/L (ref 100–111)
CK SERPL-CCNC: 111 U/L (ref 39–308)
CO2 SERPL-SCNC: 35 MMOL/L (ref 21–32)
CREAT SERPL-MCNC: 2.37 MG/DL (ref 0.6–1.3)
CRP SERPL-MCNC: 1.2 MG/DL (ref 0–0.3)
DIFFERENTIAL METHOD BLD: ABNORMAL
EOSINOPHIL # BLD: 0.2 K/UL (ref 0–0.4)
EOSINOPHIL NFR BLD: 2 % (ref 0–5)
ERYTHROCYTE [DISTWIDTH] IN BLOOD BY AUTOMATED COUNT: 18.4 % (ref 11.6–14.5)
ERYTHROCYTE [SEDIMENTATION RATE] IN BLOOD: >140 MM/HR (ref 0–20)
GLUCOSE SERPL-MCNC: 228 MG/DL (ref 74–99)
HCT VFR BLD AUTO: 24.6 % (ref 36–48)
HGB BLD-MCNC: 7.5 G/DL (ref 13–16)
LYMPHOCYTES # BLD: 1.2 K/UL (ref 0.9–3.6)
LYMPHOCYTES NFR BLD: 17 % (ref 21–52)
MCH RBC QN AUTO: 29.8 PG (ref 24–34)
MCHC RBC AUTO-ENTMCNC: 30.5 G/DL (ref 31–37)
MCV RBC AUTO: 97.6 FL (ref 74–97)
MONOCYTES # BLD: 0.8 K/UL (ref 0.05–1.2)
MONOCYTES NFR BLD: 10 % (ref 3–10)
NEUTS SEG # BLD: 5.3 K/UL (ref 1.8–8)
NEUTS SEG NFR BLD: 70 % (ref 40–73)
PLATELET # BLD AUTO: 168 K/UL (ref 135–420)
PMV BLD AUTO: 9.6 FL (ref 9.2–11.8)
POTASSIUM SERPL-SCNC: 3.4 MMOL/L (ref 3.5–5.5)
RBC # BLD AUTO: 2.52 M/UL (ref 4.7–5.5)
SODIUM SERPL-SCNC: 138 MMOL/L (ref 136–145)
WBC # BLD AUTO: 7.5 K/UL (ref 4.6–13.2)

## 2020-08-18 PROCEDURE — 80048 BASIC METABOLIC PNL TOTAL CA: CPT

## 2020-08-18 PROCEDURE — 96374 THER/PROPH/DIAG INJ IV PUSH: CPT

## 2020-08-18 PROCEDURE — 36592 COLLECT BLOOD FROM PICC: CPT

## 2020-08-18 PROCEDURE — 86140 C-REACTIVE PROTEIN: CPT

## 2020-08-18 PROCEDURE — 85652 RBC SED RATE AUTOMATED: CPT

## 2020-08-18 PROCEDURE — 74011000250 HC RX REV CODE- 250: Performed by: INTERNAL MEDICINE

## 2020-08-18 PROCEDURE — 82550 ASSAY OF CK (CPK): CPT

## 2020-08-18 PROCEDURE — 85025 COMPLETE CBC W/AUTO DIFF WBC: CPT

## 2020-08-18 PROCEDURE — 74011250636 HC RX REV CODE- 250/636: Performed by: INTERNAL MEDICINE

## 2020-08-18 RX ORDER — ACETAMINOPHEN 325 MG/1
650 TABLET ORAL AS NEEDED
Status: CANCELLED
Start: 2020-08-20

## 2020-08-18 RX ORDER — HYDROCORTISONE SODIUM SUCCINATE 100 MG/2ML
100 INJECTION, POWDER, FOR SOLUTION INTRAMUSCULAR; INTRAVENOUS AS NEEDED
Status: CANCELLED | OUTPATIENT
Start: 2020-08-20

## 2020-08-18 RX ORDER — SODIUM CHLORIDE 0.9 % (FLUSH) 0.9 %
5-10 SYRINGE (ML) INJECTION AS NEEDED
Status: CANCELLED | OUTPATIENT
Start: 2020-08-20

## 2020-08-18 RX ORDER — ALBUTEROL SULFATE 0.83 MG/ML
2.5 SOLUTION RESPIRATORY (INHALATION) AS NEEDED
Status: CANCELLED
Start: 2020-08-20

## 2020-08-18 RX ORDER — ONDANSETRON 2 MG/ML
8 INJECTION INTRAMUSCULAR; INTRAVENOUS AS NEEDED
Status: CANCELLED | OUTPATIENT
Start: 2020-08-20

## 2020-08-18 RX ORDER — HEPARIN 100 UNIT/ML
500 SYRINGE INTRAVENOUS AS NEEDED
Status: CANCELLED | OUTPATIENT
Start: 2020-08-20

## 2020-08-18 RX ORDER — DIPHENHYDRAMINE HYDROCHLORIDE 50 MG/ML
25 INJECTION, SOLUTION INTRAMUSCULAR; INTRAVENOUS AS NEEDED
Status: CANCELLED
Start: 2020-08-20

## 2020-08-18 RX ORDER — EPINEPHRINE 1 MG/ML
0.3 INJECTION, SOLUTION, CONCENTRATE INTRAVENOUS AS NEEDED
Status: CANCELLED | OUTPATIENT
Start: 2020-08-20

## 2020-08-18 RX ORDER — SODIUM CHLORIDE 9 MG/ML
10 INJECTION INTRAMUSCULAR; INTRAVENOUS; SUBCUTANEOUS AS NEEDED
Status: ACTIVE | OUTPATIENT
Start: 2020-08-18 | End: 2020-08-18

## 2020-08-18 RX ORDER — SODIUM CHLORIDE 9 MG/ML
10 INJECTION INTRAMUSCULAR; INTRAVENOUS; SUBCUTANEOUS AS NEEDED
Status: CANCELLED | OUTPATIENT
Start: 2020-08-20

## 2020-08-18 RX ORDER — DIPHENHYDRAMINE HYDROCHLORIDE 50 MG/ML
50 INJECTION, SOLUTION INTRAMUSCULAR; INTRAVENOUS AS NEEDED
Status: CANCELLED
Start: 2020-08-20

## 2020-08-18 RX ADMIN — SODIUM CHLORIDE 10 ML: 9 INJECTION INTRAMUSCULAR; INTRAVENOUS; SUBCUTANEOUS at 09:58

## 2020-08-18 RX ADMIN — DAPTOMYCIN 600 MG: 500 INJECTION, POWDER, LYOPHILIZED, FOR SOLUTION INTRAVENOUS at 09:56

## 2020-08-18 NOTE — PROGRESS NOTES
[facility] Progress Note    Date: 2020    Name: Jane Gallagher    MRN: [id number]         : 1945    Daptomycin IVP/ Weekly Labs    Mr. Shaina Hui was assessed and education was provided. Mr. Buddy Jarquin vitals were reviewed. Visit Vitals  /71 (BP 1 Location: Left arm, BP Patient Position: Sitting)   Pulse 87   Temp 97.6 °F (36.4 °C)   Resp 16   SpO2 96%       Patient arrived with the same quarter sized amount of dried serosanguinous blood on gauze under dressing. Area has not spread. Labs drawn (CBC w/automated diff; C Reative QT; CK; CMP; and ESR) per written order and sent to lab. Recent Results (from the past 12 hour(s))   CBC WITH AUTOMATED DIFF    Collection Time: 20  9:56 AM   Result Value Ref Range    WBC 7.5 4.6 - 13.2 K/uL    RBC 2.52 (L) 4.70 - 5.50 M/uL    HGB 7.5 (L) 13.0 - 16.0 g/dL    HCT 24.6 (L) 36.0 - 48.0 %    MCV 97.6 (H) 74.0 - 97.0 FL    MCH 29.8 24.0 - 34.0 PG    MCHC 30.5 (L) 31.0 - 37.0 g/dL    RDW 18.4 (H) 11.6 - 14.5 %    PLATELET 677 189 - 585 K/uL    MPV 9.6 9.2 - 11.8 FL    NEUTROPHILS 70 40 - 73 %    LYMPHOCYTES 17 (L) 21 - 52 %    MONOCYTES 10 3 - 10 %    EOSINOPHILS 2 0 - 5 %    BASOPHILS 1 0 - 2 %    ABS. NEUTROPHILS 5.3 1.8 - 8.0 K/UL    ABS. LYMPHOCYTES 1.2 0.9 - 3.6 K/UL    ABS. MONOCYTES 0.8 0.05 - 1.2 K/UL    ABS. EOSINOPHILS 0.2 0.0 - 0.4 K/UL    ABS.  BASOPHILS 0.0 0.0 - 0.1 K/UL    DF AUTOMATED     METABOLIC PANEL, BASIC    Collection Time: 20  9:56 AM   Result Value Ref Range    Sodium 138 136 - 145 mmol/L    Potassium 3.4 (L) 3.5 - 5.5 mmol/L    Chloride 97 (L) 100 - 111 mmol/L    CO2 35 (H) 21 - 32 mmol/L    Anion gap 6 3.0 - 18 mmol/L    Glucose 228 (H) 74 - 99 mg/dL    BUN 45 (H) 7.0 - 18 MG/DL    Creatinine 2.37 (H) 0.6 - 1.3 MG/DL    BUN/Creatinine ratio 19 12 - 20      GFR est AA 33 (L) >60 ml/min/1.73m2    GFR est non-AA 27 (L) >60 ml/min/1.73m2    Calcium 8.6 8.5 - 10.1 MG/DL   SED RATE (ESR)    Collection Time: 20  9:56 AM Result Value Ref Range    Sed rate, automated >140 (H) 0 - 20 mm/hr   C REACTIVE PROTEIN, QT    Collection Time: 08/18/20  9:56 AM   Result Value Ref Range    C-Reactive protein 1.2 (H) 0 - 0.3 mg/dL   CK    Collection Time: 08/18/20  9:56 AM   Result Value Ref Range     39 - 308 U/L         []  Vancomycin     []  Invanz     [x]  Cubicin 600mg     []  Rocephin    was pushed over 2 minutes as ordered. Pt with right upper arm single lumen PICC line. Lumen flushes without difficulty and positive for blood return. Lumen flushed with 20mL NS.    Mr. Ra Cross tolerated infusion, and had no complaints at this time. Patient armband removed and shredded. Mr. Ra Cross was discharged from [place] [address] in stable condition at 1000. He is return on 8/20/2020 at 1000 for his next appointment.     Elaine Murphy RN  August 18, 2020  1000

## 2020-08-19 ENCOUNTER — APPOINTMENT (OUTPATIENT)
Dept: INFUSION THERAPY | Age: 75
End: 2020-08-19

## 2020-08-20 ENCOUNTER — HOSPITAL ENCOUNTER (OUTPATIENT)
Dept: INFUSION THERAPY | Age: 75
Discharge: HOME | End: 2020-08-20
Payer: MEDICARE

## 2020-08-20 VITALS
TEMPERATURE: 98.8 F | HEART RATE: 86 BPM | SYSTOLIC BLOOD PRESSURE: 111 MMHG | DIASTOLIC BLOOD PRESSURE: 60 MMHG | OXYGEN SATURATION: 94 % | RESPIRATION RATE: 16 BRPM

## 2020-08-20 DIAGNOSIS — L97.512 RIGHT FOOT ULCER, WITH FAT LAYER EXPOSED (HCC): ICD-10-CM

## 2020-08-20 DIAGNOSIS — L97.514 RIGHT FOOT ULCER, WITH NECROSIS OF BONE (HCC): ICD-10-CM

## 2020-08-20 DIAGNOSIS — L03.115 CELLULITIS OF RIGHT FOOT: ICD-10-CM

## 2020-08-20 DIAGNOSIS — L97.514 RIGHT FOOT ULCER, WITH NECROSIS OF BONE (HCC): Primary | ICD-10-CM

## 2020-08-20 PROCEDURE — 96374 THER/PROPH/DIAG INJ IV PUSH: CPT

## 2020-08-20 PROCEDURE — 74011000250 HC RX REV CODE- 250: Performed by: INTERNAL MEDICINE

## 2020-08-20 PROCEDURE — 74011250636 HC RX REV CODE- 250/636: Performed by: INTERNAL MEDICINE

## 2020-08-20 RX ORDER — ACETAMINOPHEN 325 MG/1
650 TABLET ORAL AS NEEDED
Status: CANCELLED
Start: 2020-08-22

## 2020-08-20 RX ORDER — ONDANSETRON 2 MG/ML
8 INJECTION INTRAMUSCULAR; INTRAVENOUS AS NEEDED
Status: CANCELLED | OUTPATIENT
Start: 2020-08-22

## 2020-08-20 RX ORDER — EPINEPHRINE 1 MG/ML
0.3 INJECTION, SOLUTION, CONCENTRATE INTRAVENOUS AS NEEDED
Status: CANCELLED | OUTPATIENT
Start: 2020-08-22

## 2020-08-20 RX ORDER — ALBUTEROL SULFATE 0.83 MG/ML
2.5 SOLUTION RESPIRATORY (INHALATION) AS NEEDED
Status: CANCELLED
Start: 2020-08-22

## 2020-08-20 RX ORDER — DIPHENHYDRAMINE HYDROCHLORIDE 50 MG/ML
25 INJECTION, SOLUTION INTRAMUSCULAR; INTRAVENOUS AS NEEDED
Status: CANCELLED
Start: 2020-08-22

## 2020-08-20 RX ORDER — HYDROCORTISONE SODIUM SUCCINATE 100 MG/2ML
100 INJECTION, POWDER, FOR SOLUTION INTRAMUSCULAR; INTRAVENOUS AS NEEDED
Status: CANCELLED | OUTPATIENT
Start: 2020-08-22

## 2020-08-20 RX ORDER — DIPHENHYDRAMINE HYDROCHLORIDE 50 MG/ML
50 INJECTION, SOLUTION INTRAMUSCULAR; INTRAVENOUS AS NEEDED
Status: CANCELLED
Start: 2020-08-22

## 2020-08-20 RX ORDER — SODIUM CHLORIDE 0.9 % (FLUSH) 0.9 %
5-10 SYRINGE (ML) INJECTION AS NEEDED
Status: CANCELLED | OUTPATIENT
Start: 2020-08-22

## 2020-08-20 RX ORDER — SODIUM CHLORIDE 9 MG/ML
10 INJECTION INTRAMUSCULAR; INTRAVENOUS; SUBCUTANEOUS AS NEEDED
Status: CANCELLED | OUTPATIENT
Start: 2020-08-22

## 2020-08-20 RX ORDER — HEPARIN 100 UNIT/ML
500 SYRINGE INTRAVENOUS AS NEEDED
Status: CANCELLED | OUTPATIENT
Start: 2020-08-22

## 2020-08-20 RX ADMIN — DAPTOMYCIN 600 MG: 500 INJECTION, POWDER, LYOPHILIZED, FOR SOLUTION INTRAVENOUS at 10:17

## 2020-08-20 NOTE — PROGRESS NOTES
[facility] Progress Note    Date: 2020    Name: Tarun Morel    MRN: [id number]         : 1945    Cubicin    Mr. Dustin Leone was assessed and education was provided. Mr. Ventura Mane vitals were reviewed. Visit Vitals  /60 (BP 1 Location: Left arm, BP Patient Position: Sitting)   Pulse 86   Temp 98.8 °F (37.1 °C)   Resp 16   SpO2 94%           []  Vancomycin     []  Invanz     [x]  Cubicin 600mg     []  Rocephin    was given IVP at 12 ml/hr over 2mins. Initial flush with 10ml saline with brisk blood return. Mr. Dustin Leone tolerated IVP, and had no complaints at this time. Patient armband removed and shredded. Mr. Dustin Leone was discharged from [place] [address] in stable condition at 1020. He is to return on 20 at 0900 for his next appointment.     Jessica Hernández  2020  11:26 AM

## 2020-08-21 ENCOUNTER — APPOINTMENT (OUTPATIENT)
Dept: INFUSION THERAPY | Age: 75
End: 2020-08-21

## 2020-08-22 ENCOUNTER — HOSPITAL ENCOUNTER (OUTPATIENT)
Dept: INFUSION THERAPY | Age: 75
Discharge: HOME | End: 2020-08-22
Payer: MEDICARE

## 2020-08-22 VITALS
RESPIRATION RATE: 16 BRPM | SYSTOLIC BLOOD PRESSURE: 133 MMHG | HEART RATE: 85 BPM | OXYGEN SATURATION: 99 % | DIASTOLIC BLOOD PRESSURE: 69 MMHG | TEMPERATURE: 99.1 F

## 2020-08-22 DIAGNOSIS — L97.512 RIGHT FOOT ULCER, WITH FAT LAYER EXPOSED (HCC): ICD-10-CM

## 2020-08-22 DIAGNOSIS — L97.514 RIGHT FOOT ULCER, WITH NECROSIS OF BONE (HCC): ICD-10-CM

## 2020-08-22 DIAGNOSIS — L03.115 CELLULITIS OF RIGHT FOOT: ICD-10-CM

## 2020-08-22 PROCEDURE — 74011250636 HC RX REV CODE- 250/636: Performed by: INTERNAL MEDICINE

## 2020-08-22 PROCEDURE — 96374 THER/PROPH/DIAG INJ IV PUSH: CPT

## 2020-08-22 PROCEDURE — 77030020847 HC STATLOK BARD -A

## 2020-08-22 PROCEDURE — 74011000250 HC RX REV CODE- 250: Performed by: INTERNAL MEDICINE

## 2020-08-22 RX ORDER — SODIUM CHLORIDE 0.9 % (FLUSH) 0.9 %
10-40 SYRINGE (ML) INJECTION AS NEEDED
Status: DISCONTINUED | OUTPATIENT
Start: 2020-08-22 | End: 2020-08-26 | Stop reason: HOSPADM

## 2020-08-22 RX ORDER — HEPARIN SODIUM (PORCINE) LOCK FLUSH IV SOLN 100 UNIT/ML 100 UNIT/ML
500 SOLUTION INTRAVENOUS AS NEEDED
Status: DISCONTINUED | OUTPATIENT
Start: 2020-08-23 | End: 2020-08-22

## 2020-08-22 RX ORDER — HEPARIN SODIUM (PORCINE) LOCK FLUSH IV SOLN 100 UNIT/ML 100 UNIT/ML
500 SOLUTION INTRAVENOUS AS NEEDED
Status: DISCONTINUED | OUTPATIENT
Start: 2020-08-22 | End: 2020-08-22 | Stop reason: SDUPTHER

## 2020-08-22 RX ORDER — HEPARIN 100 UNIT/ML
500 SYRINGE INTRAVENOUS AS NEEDED
Status: DISCONTINUED | OUTPATIENT
Start: 2020-08-22 | End: 2020-08-26 | Stop reason: HOSPADM

## 2020-08-22 RX ADMIN — Medication 10 ML: at 09:25

## 2020-08-22 RX ADMIN — DAPTOMYCIN 600 MG: 500 INJECTION, POWDER, LYOPHILIZED, FOR SOLUTION INTRAVENOUS at 09:25

## 2020-08-22 RX ADMIN — HEPARIN 500 UNITS: 100 SYRINGE at 09:29

## 2020-08-22 RX ADMIN — Medication 10 ML: at 09:28

## 2020-08-22 NOTE — PROGRESS NOTES
[facility] [place] Progress Note    Date: 2020    Name: Evalene Bamberger    MRN: [id number]         : 1945      DAPTOMYCIN IV Q48 HOURS      Mr. Katlin Gutierrez arrived to [facility], ambulatory w/ cane, at 0900. Pt was assessed and education was provided. Mr. Nestor Merlos vitals were reviewed. Visit Vitals  /69 (BP 1 Location: Left arm, BP Patient Position: Sitting)   Pulse 85   Temp 99.1 °F (37.3 °C)   Resp 16   SpO2 99%       Pt w/ right upper arm single lumen PICC line. Brisk blood return/ flushes without difficulty. Last PICC dressing change was 20. PICC dressing and stat lock changed under sterile technique after cleansing site with chlorhexidine. Biopatch and skin protectant applied, and end cap changed. No redness, streaking, warmth, or drainage noted at site, and pt denies pain. Daptomycin 600mg administered as ordered IVP followed by NS flush. Mr. Katlin Gutierrez tolerated well without complaints. Flushed PICC w/ heparin per order. New curos cap applied to end of lumen. Secured PICC with new netted sleeve. Patient armband removed and shredded. Mr. Katlin Gutierrez was discharged from [place] [address] in stable condition at 0930. He is to return on 20 at 1000 for his next appointment.     Fernande Lundborg, RN  2020

## 2020-08-23 ENCOUNTER — APPOINTMENT (OUTPATIENT)
Dept: INFUSION THERAPY | Age: 75
End: 2020-08-23

## 2020-08-24 ENCOUNTER — HOSPITAL ENCOUNTER (OUTPATIENT)
Dept: INFUSION THERAPY | Age: 75
Discharge: HOME | End: 2020-08-24
Payer: MEDICARE

## 2020-08-24 VITALS
OXYGEN SATURATION: 96 % | TEMPERATURE: 98 F | HEART RATE: 90 BPM | DIASTOLIC BLOOD PRESSURE: 46 MMHG | RESPIRATION RATE: 16 BRPM | SYSTOLIC BLOOD PRESSURE: 101 MMHG

## 2020-08-24 DIAGNOSIS — L97.514 RIGHT FOOT ULCER, WITH NECROSIS OF BONE (HCC): ICD-10-CM

## 2020-08-24 DIAGNOSIS — L97.512 RIGHT FOOT ULCER, WITH FAT LAYER EXPOSED (HCC): ICD-10-CM

## 2020-08-24 DIAGNOSIS — L97.514 RIGHT FOOT ULCER, WITH NECROSIS OF BONE (HCC): Primary | ICD-10-CM

## 2020-08-24 DIAGNOSIS — L03.115 CELLULITIS OF RIGHT FOOT: ICD-10-CM

## 2020-08-24 PROCEDURE — 74011000250 HC RX REV CODE- 250: Performed by: INTERNAL MEDICINE

## 2020-08-24 PROCEDURE — 96374 THER/PROPH/DIAG INJ IV PUSH: CPT

## 2020-08-24 PROCEDURE — 74011250636 HC RX REV CODE- 250/636: Performed by: INTERNAL MEDICINE

## 2020-08-24 RX ORDER — ONDANSETRON 2 MG/ML
8 INJECTION INTRAMUSCULAR; INTRAVENOUS AS NEEDED
Status: CANCELLED | OUTPATIENT
Start: 2020-08-26

## 2020-08-24 RX ORDER — SODIUM CHLORIDE 9 MG/ML
10 INJECTION INTRAMUSCULAR; INTRAVENOUS; SUBCUTANEOUS AS NEEDED
Status: CANCELLED | OUTPATIENT
Start: 2020-08-26

## 2020-08-24 RX ORDER — DIPHENHYDRAMINE HYDROCHLORIDE 50 MG/ML
25 INJECTION, SOLUTION INTRAMUSCULAR; INTRAVENOUS AS NEEDED
Status: CANCELLED
Start: 2020-08-26

## 2020-08-24 RX ORDER — SODIUM CHLORIDE 0.9 % (FLUSH) 0.9 %
5-10 SYRINGE (ML) INJECTION AS NEEDED
Status: CANCELLED | OUTPATIENT
Start: 2020-08-26

## 2020-08-24 RX ORDER — EPINEPHRINE 1 MG/ML
0.3 INJECTION, SOLUTION, CONCENTRATE INTRAVENOUS AS NEEDED
Status: CANCELLED | OUTPATIENT
Start: 2020-08-26

## 2020-08-24 RX ORDER — ACETAMINOPHEN 325 MG/1
650 TABLET ORAL AS NEEDED
Status: CANCELLED
Start: 2020-08-26

## 2020-08-24 RX ORDER — HEPARIN 100 UNIT/ML
500 SYRINGE INTRAVENOUS AS NEEDED
Status: CANCELLED | OUTPATIENT
Start: 2020-08-26

## 2020-08-24 RX ORDER — HYDROCORTISONE SODIUM SUCCINATE 100 MG/2ML
100 INJECTION, POWDER, FOR SOLUTION INTRAMUSCULAR; INTRAVENOUS AS NEEDED
Status: CANCELLED | OUTPATIENT
Start: 2020-08-26

## 2020-08-24 RX ORDER — DIPHENHYDRAMINE HYDROCHLORIDE 50 MG/ML
50 INJECTION, SOLUTION INTRAMUSCULAR; INTRAVENOUS AS NEEDED
Status: CANCELLED
Start: 2020-08-26

## 2020-08-24 RX ORDER — ALBUTEROL SULFATE 0.83 MG/ML
2.5 SOLUTION RESPIRATORY (INHALATION) AS NEEDED
Status: CANCELLED
Start: 2020-08-26

## 2020-08-24 RX ADMIN — DAPTOMYCIN 600 MG: 500 INJECTION, POWDER, LYOPHILIZED, FOR SOLUTION INTRAVENOUS at 10:08

## 2020-08-24 NOTE — PROGRESS NOTES
[facility] Progress Note    Date: 2020    Name: Dayna Rachel    MRN: [id number]         : 1945    Cubicin    Mr. Jarret Rajput was assessed and education was provided. Mr. Menon Chamber vitals were reviewed. Visit Vitals  /46 (BP 1 Location: Left arm, BP Patient Position: Sitting)   Pulse 90   Temp 98 °F (36.7 °C)   Resp 16   SpO2 96%           []  Vancomycin     []  Invanz     [x]  Cubicin 600mg     []  Rocephin    was infused at  12 ml/hr over 2 mins. PICC line flushed with 10 ml normal saline with brisk blood return. Mr. Jarret Rajput tolerated infusion, and had no complaints at this time. PICC line capped with curos. Patient armband removed and shredded. Mr. Jarret Rajput was discharged from [place] [address] in stable condition at 1020. He is to return on 20 at 0930 for his next appointment.     Drea Hernandez RN  2020  10:52 AM

## 2020-08-25 ENCOUNTER — APPOINTMENT (OUTPATIENT)
Dept: INFUSION THERAPY | Age: 75
End: 2020-08-25

## 2020-08-26 ENCOUNTER — HOSPITAL ENCOUNTER (OUTPATIENT)
Dept: INFUSION THERAPY | Age: 75
Discharge: HOME | End: 2020-08-26
Payer: MEDICARE

## 2020-08-26 VITALS
TEMPERATURE: 97.4 F | OXYGEN SATURATION: 95 % | HEART RATE: 87 BPM | SYSTOLIC BLOOD PRESSURE: 113 MMHG | DIASTOLIC BLOOD PRESSURE: 68 MMHG | RESPIRATION RATE: 16 BRPM

## 2020-08-26 DIAGNOSIS — L97.514 RIGHT FOOT ULCER, WITH NECROSIS OF BONE (HCC): Primary | ICD-10-CM

## 2020-08-26 DIAGNOSIS — L97.512 RIGHT FOOT ULCER, WITH FAT LAYER EXPOSED (HCC): ICD-10-CM

## 2020-08-26 DIAGNOSIS — L03.115 CELLULITIS OF RIGHT FOOT: ICD-10-CM

## 2020-08-26 DIAGNOSIS — L97.514 RIGHT FOOT ULCER, WITH NECROSIS OF BONE (HCC): ICD-10-CM

## 2020-08-26 LAB
ANION GAP SERPL CALC-SCNC: 7 MMOL/L (ref 3–18)
BASOPHILS # BLD: 0 K/UL (ref 0–0.1)
BASOPHILS NFR BLD: 0 % (ref 0–2)
BUN SERPL-MCNC: 51 MG/DL (ref 7–18)
BUN/CREAT SERPL: 21 (ref 12–20)
CALCIUM SERPL-MCNC: 8.6 MG/DL (ref 8.5–10.1)
CHLORIDE SERPL-SCNC: 97 MMOL/L (ref 100–111)
CK SERPL-CCNC: 54 U/L (ref 39–308)
CO2 SERPL-SCNC: 34 MMOL/L (ref 21–32)
CREAT SERPL-MCNC: 2.4 MG/DL (ref 0.6–1.3)
CRP SERPL-MCNC: 1.4 MG/DL (ref 0–0.3)
DIFFERENTIAL METHOD BLD: ABNORMAL
EOSINOPHIL # BLD: 0.1 K/UL (ref 0–0.4)
EOSINOPHIL NFR BLD: 2 % (ref 0–5)
ERYTHROCYTE [DISTWIDTH] IN BLOOD BY AUTOMATED COUNT: 17.4 % (ref 11.6–14.5)
ERYTHROCYTE [SEDIMENTATION RATE] IN BLOOD: >140 MM/HR (ref 0–20)
GLUCOSE SERPL-MCNC: 177 MG/DL (ref 74–99)
HCT VFR BLD AUTO: 19.5 % (ref 36–48)
HGB BLD-MCNC: 6 G/DL (ref 13–16)
LYMPHOCYTES # BLD: 1 K/UL (ref 0.9–3.6)
LYMPHOCYTES NFR BLD: 21 % (ref 21–52)
MCH RBC QN AUTO: 29.4 PG (ref 24–34)
MCHC RBC AUTO-ENTMCNC: 30.8 G/DL (ref 31–37)
MCV RBC AUTO: 95.6 FL (ref 74–97)
MONOCYTES # BLD: 0.4 K/UL (ref 0.05–1.2)
MONOCYTES NFR BLD: 9 % (ref 3–10)
NEUTS SEG # BLD: 3.2 K/UL (ref 1.8–8)
NEUTS SEG NFR BLD: 68 % (ref 40–73)
PLATELET # BLD AUTO: 107 K/UL (ref 135–420)
PMV BLD AUTO: 9 FL (ref 9.2–11.8)
POTASSIUM SERPL-SCNC: 3.5 MMOL/L (ref 3.5–5.5)
RBC # BLD AUTO: 2.04 M/UL (ref 4.7–5.5)
SODIUM SERPL-SCNC: 138 MMOL/L (ref 136–145)
WBC # BLD AUTO: 4.8 K/UL (ref 4.6–13.2)

## 2020-08-26 PROCEDURE — 80048 BASIC METABOLIC PNL TOTAL CA: CPT

## 2020-08-26 PROCEDURE — 36415 COLL VENOUS BLD VENIPUNCTURE: CPT

## 2020-08-26 PROCEDURE — 85025 COMPLETE CBC W/AUTO DIFF WBC: CPT

## 2020-08-26 PROCEDURE — 96374 THER/PROPH/DIAG INJ IV PUSH: CPT

## 2020-08-26 PROCEDURE — 74011000250 HC RX REV CODE- 250: Performed by: INTERNAL MEDICINE

## 2020-08-26 PROCEDURE — 85652 RBC SED RATE AUTOMATED: CPT

## 2020-08-26 PROCEDURE — 74011250636 HC RX REV CODE- 250/636: Performed by: INTERNAL MEDICINE

## 2020-08-26 PROCEDURE — 36592 COLLECT BLOOD FROM PICC: CPT

## 2020-08-26 PROCEDURE — 82550 ASSAY OF CK (CPK): CPT

## 2020-08-26 PROCEDURE — 86140 C-REACTIVE PROTEIN: CPT

## 2020-08-26 RX ORDER — SODIUM CHLORIDE 9 MG/ML
10 INJECTION INTRAMUSCULAR; INTRAVENOUS; SUBCUTANEOUS AS NEEDED
Status: ACTIVE | OUTPATIENT
Start: 2020-08-26 | End: 2020-08-26

## 2020-08-26 RX ORDER — DIPHENHYDRAMINE HYDROCHLORIDE 50 MG/ML
25 INJECTION, SOLUTION INTRAMUSCULAR; INTRAVENOUS AS NEEDED
Status: CANCELLED
Start: 2020-08-28

## 2020-08-26 RX ORDER — HYDROCORTISONE SODIUM SUCCINATE 100 MG/2ML
100 INJECTION, POWDER, FOR SOLUTION INTRAMUSCULAR; INTRAVENOUS AS NEEDED
Status: CANCELLED | OUTPATIENT
Start: 2020-08-28

## 2020-08-26 RX ORDER — ACETAMINOPHEN 325 MG/1
650 TABLET ORAL AS NEEDED
Status: CANCELLED
Start: 2020-08-28

## 2020-08-26 RX ORDER — EPINEPHRINE 1 MG/ML
0.3 INJECTION, SOLUTION, CONCENTRATE INTRAVENOUS AS NEEDED
Status: CANCELLED | OUTPATIENT
Start: 2020-08-28

## 2020-08-26 RX ORDER — ONDANSETRON 2 MG/ML
8 INJECTION INTRAMUSCULAR; INTRAVENOUS AS NEEDED
Status: CANCELLED | OUTPATIENT
Start: 2020-08-28

## 2020-08-26 RX ORDER — HEPARIN 100 UNIT/ML
500 SYRINGE INTRAVENOUS AS NEEDED
Status: ACTIVE | OUTPATIENT
Start: 2020-08-26 | End: 2020-08-26

## 2020-08-26 RX ORDER — HEPARIN 100 UNIT/ML
500 SYRINGE INTRAVENOUS AS NEEDED
Status: CANCELLED | OUTPATIENT
Start: 2020-08-28

## 2020-08-26 RX ORDER — SODIUM CHLORIDE 9 MG/ML
10 INJECTION INTRAMUSCULAR; INTRAVENOUS; SUBCUTANEOUS AS NEEDED
Status: CANCELLED | OUTPATIENT
Start: 2020-08-28

## 2020-08-26 RX ORDER — DIPHENHYDRAMINE HYDROCHLORIDE 50 MG/ML
50 INJECTION, SOLUTION INTRAMUSCULAR; INTRAVENOUS AS NEEDED
Status: CANCELLED
Start: 2020-08-28

## 2020-08-26 RX ORDER — SODIUM CHLORIDE 0.9 % (FLUSH) 0.9 %
5-10 SYRINGE (ML) INJECTION AS NEEDED
Status: CANCELLED | OUTPATIENT
Start: 2020-08-28

## 2020-08-26 RX ORDER — ALBUTEROL SULFATE 0.83 MG/ML
2.5 SOLUTION RESPIRATORY (INHALATION) AS NEEDED
Status: CANCELLED
Start: 2020-08-28

## 2020-08-26 RX ADMIN — DAPTOMYCIN 600 MG: 500 INJECTION, POWDER, LYOPHILIZED, FOR SOLUTION INTRAVENOUS at 09:57

## 2020-08-26 RX ADMIN — SODIUM CHLORIDE 10 ML: 9 INJECTION INTRAMUSCULAR; INTRAVENOUS; SUBCUTANEOUS at 09:57

## 2020-08-26 NOTE — PROGRESS NOTES
[facility] Progress Note    Date: 2020    Name: Alonzo Woodard    MRN: [id number]         : 1945     Cubicin    Mr. Cecile Urias was assessed and education was provided. Mr. Marge Qiu vitals were reviewed. Visit Vitals  /68 (BP 1 Location: Left arm, BP Patient Position: Sitting)   Pulse 87   Temp 97.4 °F (36.3 °C)   Resp 16   SpO2 95%       Lab results were obtained and reviewed. Recent Results (from the past 12 hour(s))   CBC WITH AUTOMATED DIFF    Collection Time: 20 10:04 AM   Result Value Ref Range    WBC 4.8 4.6 - 13.2 K/uL    RBC 2.04 (L) 4.70 - 5.50 M/uL    HGB 6.0 (L) 13.0 - 16.0 g/dL    HCT 19.5 (L) 36.0 - 48.0 %    MCV 95.6 74.0 - 97.0 FL    MCH 29.4 24.0 - 34.0 PG    MCHC 30.8 (L) 31.0 - 37.0 g/dL    RDW 17.4 (H) 11.6 - 14.5 %    PLATELET 677 (L) 707 - 420 K/uL    MPV 9.0 (L) 9.2 - 11.8 FL    NEUTROPHILS 68 40 - 73 %    LYMPHOCYTES 21 21 - 52 %    MONOCYTES 9 3 - 10 %    EOSINOPHILS 2 0 - 5 %    BASOPHILS 0 0 - 2 %    ABS. NEUTROPHILS 3.2 1.8 - 8.0 K/UL    ABS. LYMPHOCYTES 1.0 0.9 - 3.6 K/UL    ABS. MONOCYTES 0.4 0.05 - 1.2 K/UL    ABS. EOSINOPHILS 0.1 0.0 - 0.4 K/UL    ABS.  BASOPHILS 0.0 0.0 - 0.1 K/UL    DF AUTOMATED     METABOLIC PANEL, BASIC    Collection Time: 20 10:08 AM   Result Value Ref Range    Sodium 138 136 - 145 mmol/L    Potassium 3.5 3.5 - 5.5 mmol/L    Chloride 97 (L) 100 - 111 mmol/L    CO2 34 (H) 21 - 32 mmol/L    Anion gap 7 3.0 - 18 mmol/L    Glucose 177 (H) 74 - 99 mg/dL    BUN 51 (H) 7.0 - 18 MG/DL    Creatinine 2.40 (H) 0.6 - 1.3 MG/DL    BUN/Creatinine ratio 21 (H) 12 - 20      GFR est AA 32 (L) >60 ml/min/1.73m2    GFR est non-AA 27 (L) >60 ml/min/1.73m2    Calcium 8.6 8.5 - 10.1 MG/DL   SED RATE (ESR)    Collection Time: 20 10:08 AM   Result Value Ref Range    Sed rate, automated >140 (H) 0 - 20 mm/hr   CK    Collection Time: 20 10:08 AM   Result Value Ref Range    CK 54 39 - 308 U/L     Blood drawn for CK, C-REACTIVE PROTEIN, SED RATE, METABOLIC PANEL AND CBC. Flushed with 10 mL normal saline before and after. Blood return visualized. []  Vancomycin     []  Invanz     [x]  Cubicin 600mg     []  Rocephin    was infused at  12 ml/hr over 2 mins. Mr. Jarret Rajput tolerated infusion, and had no complaints at this time. Patient armband removed and shredded. Mr. Jarret Rajput was discharged from [place] [address] in stable condition at 5098. He is to return on 8/28/20 at 0930 for his next appointment.     Drea Hernandez RN  August 26, 2020  2:31 PM

## 2020-08-27 ENCOUNTER — APPOINTMENT (OUTPATIENT)
Dept: INFUSION THERAPY | Age: 75
End: 2020-08-27

## 2020-08-28 ENCOUNTER — HOSPITAL ENCOUNTER (OUTPATIENT)
Dept: INFUSION THERAPY | Age: 75
Discharge: HOME | End: 2020-08-28
Payer: MEDICARE

## 2020-08-28 VITALS
HEART RATE: 89 BPM | OXYGEN SATURATION: 98 % | DIASTOLIC BLOOD PRESSURE: 69 MMHG | SYSTOLIC BLOOD PRESSURE: 133 MMHG | TEMPERATURE: 97.7 F | RESPIRATION RATE: 16 BRPM

## 2020-08-28 DIAGNOSIS — L97.514 RIGHT FOOT ULCER, WITH NECROSIS OF BONE (HCC): Primary | ICD-10-CM

## 2020-08-28 DIAGNOSIS — L97.512 RIGHT FOOT ULCER, WITH FAT LAYER EXPOSED (HCC): ICD-10-CM

## 2020-08-28 DIAGNOSIS — L03.115 CELLULITIS OF RIGHT FOOT: ICD-10-CM

## 2020-08-28 DIAGNOSIS — L97.514 RIGHT FOOT ULCER, WITH NECROSIS OF BONE (HCC): ICD-10-CM

## 2020-08-28 PROCEDURE — 77030020847 HC STATLOK BARD -A

## 2020-08-28 PROCEDURE — 96374 THER/PROPH/DIAG INJ IV PUSH: CPT

## 2020-08-28 PROCEDURE — 74011250636 HC RX REV CODE- 250/636: Performed by: INTERNAL MEDICINE

## 2020-08-28 PROCEDURE — 74011000250 HC RX REV CODE- 250: Performed by: INTERNAL MEDICINE

## 2020-08-28 RX ORDER — EPINEPHRINE 1 MG/ML
0.3 INJECTION, SOLUTION, CONCENTRATE INTRAVENOUS AS NEEDED
Status: CANCELLED | OUTPATIENT
Start: 2020-08-30

## 2020-08-28 RX ORDER — ACETAMINOPHEN 325 MG/1
650 TABLET ORAL AS NEEDED
Status: CANCELLED
Start: 2020-08-30

## 2020-08-28 RX ORDER — SODIUM CHLORIDE 0.9 % (FLUSH) 0.9 %
5-10 SYRINGE (ML) INJECTION AS NEEDED
Status: CANCELLED | OUTPATIENT
Start: 2020-08-30

## 2020-08-28 RX ORDER — HYDROCORTISONE SODIUM SUCCINATE 100 MG/2ML
100 INJECTION, POWDER, FOR SOLUTION INTRAMUSCULAR; INTRAVENOUS AS NEEDED
Status: CANCELLED | OUTPATIENT
Start: 2020-08-30

## 2020-08-28 RX ORDER — SODIUM CHLORIDE 0.9 % (FLUSH) 0.9 %
5-10 SYRINGE (ML) INJECTION AS NEEDED
Status: DISPENSED | OUTPATIENT
Start: 2020-08-28 | End: 2020-08-28

## 2020-08-28 RX ORDER — ONDANSETRON 2 MG/ML
8 INJECTION INTRAMUSCULAR; INTRAVENOUS AS NEEDED
Status: CANCELLED | OUTPATIENT
Start: 2020-08-30

## 2020-08-28 RX ORDER — DIPHENHYDRAMINE HYDROCHLORIDE 50 MG/ML
25 INJECTION, SOLUTION INTRAMUSCULAR; INTRAVENOUS AS NEEDED
Status: CANCELLED
Start: 2020-08-30

## 2020-08-28 RX ORDER — ALBUTEROL SULFATE 0.83 MG/ML
2.5 SOLUTION RESPIRATORY (INHALATION) AS NEEDED
Status: CANCELLED
Start: 2020-08-30

## 2020-08-28 RX ORDER — HEPARIN 100 UNIT/ML
500 SYRINGE INTRAVENOUS AS NEEDED
Status: CANCELLED | OUTPATIENT
Start: 2020-08-30

## 2020-08-28 RX ORDER — SODIUM CHLORIDE 9 MG/ML
10 INJECTION INTRAMUSCULAR; INTRAVENOUS; SUBCUTANEOUS AS NEEDED
Status: CANCELLED | OUTPATIENT
Start: 2020-08-30

## 2020-08-28 RX ORDER — DIPHENHYDRAMINE HYDROCHLORIDE 50 MG/ML
50 INJECTION, SOLUTION INTRAMUSCULAR; INTRAVENOUS AS NEEDED
Status: CANCELLED
Start: 2020-08-30

## 2020-08-28 RX ADMIN — DAPTOMYCIN 600 MG: 500 INJECTION, POWDER, LYOPHILIZED, FOR SOLUTION INTRAVENOUS at 09:59

## 2020-08-28 RX ADMIN — Medication 10 ML: at 09:59

## 2020-08-28 NOTE — PROGRESS NOTES
[facility] Progress Note    Date: 2020    Name: Bhupinder Beckman    MRN: [id number]         : 1945     CUBICIN    Mr. Alan Campbell was assessed and education was provided. Mr. Eduardo Fowler vitals were reviewed. Visit Vitals  /69 (BP 1 Location: Left arm, BP Patient Position: Sitting)   Pulse 89   Temp 97.7 °F (36.5 °C)   Resp 16   SpO2 98%       Lab results were obtained and reviewed. No results found for this or any previous visit (from the past 12 hour(s)). []  Vancomycin     []  Invanz     [x]  Cubicin 600mg     []  Rocephin    was infused at  12 ml/hr over 2 mins. PICC flushed before and after with a total of 20 mL NS, blood return visualized. Mr. Alan Campbell tolerated infusion, and had no complaints at this time. PICC dressing and stat lock changed per protocol. No redness, streaking, warmth, or drainage noted at site. Microclaves changed and capped with curos. Pt given new stockinette to protect dressing. Patient armband removed and shredded. Mr. Alan Campbell was discharged from [place] [address] in stable condition at 1035. He is to return on 20 at 0830 for his next appointment.     Brandi Jacobs RN   2020  11:49 AM

## 2020-08-29 ENCOUNTER — APPOINTMENT (OUTPATIENT)
Dept: INFUSION THERAPY | Age: 75
End: 2020-08-29

## 2020-08-30 ENCOUNTER — HOSPITAL ENCOUNTER (OUTPATIENT)
Dept: INFUSION THERAPY | Age: 75
Discharge: HOME | End: 2020-08-30
Payer: MEDICARE

## 2020-08-30 VITALS
HEART RATE: 81 BPM | RESPIRATION RATE: 18 BRPM | TEMPERATURE: 97.8 F | OXYGEN SATURATION: 99 % | SYSTOLIC BLOOD PRESSURE: 129 MMHG | DIASTOLIC BLOOD PRESSURE: 69 MMHG

## 2020-08-30 DIAGNOSIS — L97.514 RIGHT FOOT ULCER, WITH NECROSIS OF BONE (HCC): ICD-10-CM

## 2020-08-30 DIAGNOSIS — L03.115 CELLULITIS OF RIGHT FOOT: ICD-10-CM

## 2020-08-30 DIAGNOSIS — L97.512 RIGHT FOOT ULCER, WITH FAT LAYER EXPOSED (HCC): ICD-10-CM

## 2020-08-30 PROCEDURE — 74011000250 HC RX REV CODE- 250: Performed by: INTERNAL MEDICINE

## 2020-08-30 PROCEDURE — 96374 THER/PROPH/DIAG INJ IV PUSH: CPT

## 2020-08-30 PROCEDURE — 74011250636 HC RX REV CODE- 250/636: Performed by: INTERNAL MEDICINE

## 2020-08-30 RX ADMIN — DAPTOMYCIN 600 MG: 500 INJECTION, POWDER, LYOPHILIZED, FOR SOLUTION INTRAVENOUS at 08:46

## 2020-08-30 NOTE — PROGRESS NOTES
\[facility]\"" Progress Note    Date: 2020    Name: Rebecca Saxena    MRN: [id number]         : 1945     CUBICIN every other day. Mr. Juan Manuel Olson was assessed and education was provided. Mr. Carmencita Aaron vitals were reviewed. Visit Vitals  /69 (BP 1 Location: Left arm, BP Patient Position: Sitting)   Pulse 81   Temp 97.8 °F (36.6 °C)   Resp 18   SpO2 99%           []  Vancomycin     []  Invanz     [x]  Cubicin 600mg     []  Rocephin    was infused at  12 ml/hr over 2 mins. PICC flushed before and after with a total of 20 mL NS, blood return visualized. Mr. Juan Manuel Olson tolerated infusion, and had no complaints at this time. Patient armband removed and shredded. Mr. Juan Manuel Olson was discharged from [place] [address] in stable condition at [phone]. He is to return on 20 at 1000 for his next appointment at [facility].     Wali Briceno RN   2020  11:49 AM

## 2020-08-31 ENCOUNTER — APPOINTMENT (OUTPATIENT)
Dept: INFUSION THERAPY | Age: 75
End: 2020-08-31

## 2020-09-01 ENCOUNTER — HOSPITAL ENCOUNTER (OUTPATIENT)
Dept: INFUSION THERAPY | Age: 75
Discharge: HOME | End: 2020-09-01
Payer: MEDICARE

## 2020-09-01 VITALS
SYSTOLIC BLOOD PRESSURE: 123 MMHG | DIASTOLIC BLOOD PRESSURE: 50 MMHG | HEART RATE: 85 BPM | OXYGEN SATURATION: 98 % | TEMPERATURE: 98.1 F | RESPIRATION RATE: 18 BRPM

## 2020-09-01 DIAGNOSIS — L03.115 CELLULITIS OF RIGHT FOOT: ICD-10-CM

## 2020-09-01 DIAGNOSIS — L97.514 RIGHT FOOT ULCER, WITH NECROSIS OF BONE (HCC): Primary | ICD-10-CM

## 2020-09-01 DIAGNOSIS — L97.512 RIGHT FOOT ULCER, WITH FAT LAYER EXPOSED (HCC): ICD-10-CM

## 2020-09-01 DIAGNOSIS — L97.514 RIGHT FOOT ULCER, WITH NECROSIS OF BONE (HCC): ICD-10-CM

## 2020-09-01 PROCEDURE — 96374 THER/PROPH/DIAG INJ IV PUSH: CPT

## 2020-09-01 PROCEDURE — 74011000250 HC RX REV CODE- 250: Performed by: INTERNAL MEDICINE

## 2020-09-01 PROCEDURE — 74011250636 HC RX REV CODE- 250/636: Performed by: INTERNAL MEDICINE

## 2020-09-01 RX ORDER — SODIUM CHLORIDE 9 MG/ML
10 INJECTION INTRAMUSCULAR; INTRAVENOUS; SUBCUTANEOUS AS NEEDED
Status: CANCELLED | OUTPATIENT
Start: 2020-09-03

## 2020-09-01 RX ORDER — ONDANSETRON 2 MG/ML
8 INJECTION INTRAMUSCULAR; INTRAVENOUS AS NEEDED
Status: CANCELLED | OUTPATIENT
Start: 2020-09-03

## 2020-09-01 RX ORDER — SODIUM CHLORIDE 0.9 % (FLUSH) 0.9 %
5-10 SYRINGE (ML) INJECTION AS NEEDED
Status: CANCELLED | OUTPATIENT
Start: 2020-09-03

## 2020-09-01 RX ORDER — EPINEPHRINE 1 MG/ML
0.3 INJECTION, SOLUTION, CONCENTRATE INTRAVENOUS AS NEEDED
Status: CANCELLED | OUTPATIENT
Start: 2020-09-03

## 2020-09-01 RX ORDER — DIPHENHYDRAMINE HYDROCHLORIDE 50 MG/ML
25 INJECTION, SOLUTION INTRAMUSCULAR; INTRAVENOUS AS NEEDED
Status: CANCELLED
Start: 2020-09-03

## 2020-09-01 RX ORDER — HYDROCORTISONE SODIUM SUCCINATE 100 MG/2ML
100 INJECTION, POWDER, FOR SOLUTION INTRAMUSCULAR; INTRAVENOUS AS NEEDED
Status: CANCELLED | OUTPATIENT
Start: 2020-09-03

## 2020-09-01 RX ORDER — ALBUTEROL SULFATE 0.83 MG/ML
2.5 SOLUTION RESPIRATORY (INHALATION) AS NEEDED
Status: CANCELLED
Start: 2020-09-03

## 2020-09-01 RX ORDER — HEPARIN 100 UNIT/ML
500 SYRINGE INTRAVENOUS AS NEEDED
Status: CANCELLED | OUTPATIENT
Start: 2020-09-03

## 2020-09-01 RX ORDER — DIPHENHYDRAMINE HYDROCHLORIDE 50 MG/ML
50 INJECTION, SOLUTION INTRAMUSCULAR; INTRAVENOUS AS NEEDED
Status: CANCELLED
Start: 2020-09-03

## 2020-09-01 RX ORDER — ACETAMINOPHEN 325 MG/1
650 TABLET ORAL AS NEEDED
Status: CANCELLED
Start: 2020-09-03

## 2020-09-01 RX ADMIN — DAPTOMYCIN 600 MG: 500 INJECTION, POWDER, LYOPHILIZED, FOR SOLUTION INTRAVENOUS at 10:27

## 2020-09-01 NOTE — PROGRESS NOTES
\[facility] [place] [facility]\"" Progress Note    Date: 2020    Name: Rakan Salguero    MRN: [id number]         : 1945     CUBICIN    Mr. Griselda Rivera was assessed and education was provided. Mr. Marito Soriano vitals were reviewed. Visit Vitals  /50 (BP 1 Location: Left arm, BP Patient Position: Sitting)   Pulse 85   Temp 98.1 °F (36.7 °C)   Resp 18   SpO2 98%       Lab results were obtained and reviewed. No results found for this or any previous visit (from the past 12 hour(s)). []  Vancomycin     []  Invanz     [x]  Cubicin 600mg     []  Rocephin    was infused at  12 ml/hr over 2 mins. Mr. Griselda Rivera tolerated infusion, and had no complaints at this time. Patient armband removed and shredded. Mr. Griselda Rivera was discharged from [place] [address] in stable condition at 1040. He is to return on 9/3/20 at 0930 for his next appointment.     Poonam Collins RN  2020  2:21 PM

## 2020-09-02 ENCOUNTER — APPOINTMENT (OUTPATIENT)
Dept: INFUSION THERAPY | Age: 75
End: 2020-09-02

## 2020-09-03 ENCOUNTER — HOSPITAL ENCOUNTER (OUTPATIENT)
Dept: INFUSION THERAPY | Age: 75
Discharge: HOME | End: 2020-09-03
Payer: MEDICARE

## 2020-09-03 VITALS
RESPIRATION RATE: 18 BRPM | BODY MASS INDEX: 35.54 KG/M2 | SYSTOLIC BLOOD PRESSURE: 136 MMHG | TEMPERATURE: 97.6 F | WEIGHT: 220.2 LBS | DIASTOLIC BLOOD PRESSURE: 72 MMHG

## 2020-09-03 DIAGNOSIS — L97.512 RIGHT FOOT ULCER, WITH FAT LAYER EXPOSED (HCC): ICD-10-CM

## 2020-09-03 DIAGNOSIS — L03.115 CELLULITIS OF RIGHT FOOT: ICD-10-CM

## 2020-09-03 DIAGNOSIS — L97.514 RIGHT FOOT ULCER, WITH NECROSIS OF BONE (HCC): Primary | ICD-10-CM

## 2020-09-03 DIAGNOSIS — L97.514 RIGHT FOOT ULCER, WITH NECROSIS OF BONE (HCC): ICD-10-CM

## 2020-09-03 PROCEDURE — 74011250636 HC RX REV CODE- 250/636: Performed by: INTERNAL MEDICINE

## 2020-09-03 PROCEDURE — 74011000250 HC RX REV CODE- 250: Performed by: INTERNAL MEDICINE

## 2020-09-03 PROCEDURE — 96374 THER/PROPH/DIAG INJ IV PUSH: CPT

## 2020-09-03 RX ORDER — HEPARIN 100 UNIT/ML
500 SYRINGE INTRAVENOUS AS NEEDED
Status: DISPENSED | OUTPATIENT
Start: 2020-09-03 | End: 2020-09-03

## 2020-09-03 RX ORDER — DIPHENHYDRAMINE HYDROCHLORIDE 50 MG/ML
25 INJECTION, SOLUTION INTRAMUSCULAR; INTRAVENOUS AS NEEDED
Status: CANCELLED
Start: 2020-09-07

## 2020-09-03 RX ORDER — SODIUM CHLORIDE 0.9 % (FLUSH) 0.9 %
5-10 SYRINGE (ML) INJECTION AS NEEDED
Status: CANCELLED | OUTPATIENT
Start: 2020-09-07

## 2020-09-03 RX ORDER — HYDROCORTISONE SODIUM SUCCINATE 100 MG/2ML
100 INJECTION, POWDER, FOR SOLUTION INTRAMUSCULAR; INTRAVENOUS AS NEEDED
Status: CANCELLED | OUTPATIENT
Start: 2020-09-07

## 2020-09-03 RX ORDER — DIPHENHYDRAMINE HYDROCHLORIDE 50 MG/ML
50 INJECTION, SOLUTION INTRAMUSCULAR; INTRAVENOUS AS NEEDED
Status: CANCELLED
Start: 2020-09-07

## 2020-09-03 RX ORDER — ONDANSETRON 2 MG/ML
8 INJECTION INTRAMUSCULAR; INTRAVENOUS AS NEEDED
Status: CANCELLED | OUTPATIENT
Start: 2020-09-07

## 2020-09-03 RX ORDER — SODIUM CHLORIDE 9 MG/ML
10 INJECTION INTRAMUSCULAR; INTRAVENOUS; SUBCUTANEOUS AS NEEDED
Status: CANCELLED | OUTPATIENT
Start: 2020-09-07

## 2020-09-03 RX ORDER — ACETAMINOPHEN 325 MG/1
650 TABLET ORAL AS NEEDED
Status: CANCELLED
Start: 2020-09-07

## 2020-09-03 RX ORDER — EPINEPHRINE 1 MG/ML
0.3 INJECTION, SOLUTION, CONCENTRATE INTRAVENOUS AS NEEDED
Status: CANCELLED | OUTPATIENT
Start: 2020-09-07

## 2020-09-03 RX ORDER — SODIUM CHLORIDE 0.9 % (FLUSH) 0.9 %
5-10 SYRINGE (ML) INJECTION AS NEEDED
Status: DISPENSED | OUTPATIENT
Start: 2020-09-03 | End: 2020-09-03

## 2020-09-03 RX ORDER — HEPARIN 100 UNIT/ML
500 SYRINGE INTRAVENOUS AS NEEDED
Status: CANCELLED | OUTPATIENT
Start: 2020-09-07

## 2020-09-03 RX ORDER — ALBUTEROL SULFATE 0.83 MG/ML
2.5 SOLUTION RESPIRATORY (INHALATION) AS NEEDED
Status: CANCELLED
Start: 2020-09-07

## 2020-09-03 RX ADMIN — DAPTOMYCIN 600 MG: 500 INJECTION, POWDER, LYOPHILIZED, FOR SOLUTION INTRAVENOUS at 10:11

## 2020-09-03 RX ADMIN — Medication 10 ML: at 10:11

## 2020-09-03 NOTE — PROGRESS NOTES
[facility] Progress Note    Date: September 3, 2020    Name: Emile Horowitz    MRN: [id number]         : 1945     CUBICIN    Mr. Whit Cuenca was assessed and education was provided. Mr. Penny López vitals were reviewed. Visit Vitals  /72 (BP 1 Location: Left arm, BP Patient Position: Sitting)   Temp 97.6 °F (36.4 °C)   Resp 18   Wt 99.9 kg (220 lb 3.2 oz)   BMI 35.54 kg/m²       Lab results were obtained and reviewed. No results found for this or any previous visit (from the past 12 hour(s)). []  Vancomycin     []  Invanz     [x]  Cubicin 600mg     []  Rocephin    was infused at  12 ml/hr over 2 mins. Mr. Whit Cuenca tolerated infusion, and had no complaints at this time. Patient armband removed and shredded. Mr. Whit Cuenca was discharged from [place] [address] in stable condition at 1025. He is to return on 20 at 1430 for his next appointment.     Myla Gardner RN  September 3, 2020  10:44 AM

## 2020-09-04 ENCOUNTER — HOSPITAL ENCOUNTER (OUTPATIENT)
Dept: INFUSION THERAPY | Age: 75
Discharge: HOME | End: 2020-09-04
Payer: MEDICARE

## 2020-09-04 VITALS
RESPIRATION RATE: 18 BRPM | TEMPERATURE: 98.3 F | DIASTOLIC BLOOD PRESSURE: 63 MMHG | HEART RATE: 78 BPM | SYSTOLIC BLOOD PRESSURE: 116 MMHG

## 2020-09-04 PROCEDURE — 99211 OFF/OP EST MAY X REQ PHY/QHP: CPT

## 2020-09-04 NOTE — PROGRESS NOTES
[facility] Progress Note    Date: 2020    Name: Janae Cody    MRN: [id number]         : 1945     PICC Removal    Mr. Jacqueline Sellers was assessed and education was provided. Mr. Jaci Kocher vitals were reviewed and patient was observed for 5 minutes prior to treatment. Visit Vitals  /60 (BP 1 Location: Left arm)   Pulse 96   Temp 98.4 °F (36.9 °C)   Resp 18       Lab results were obtained and reviewed. No results found for this or any previous visit (from the past 12 hour(s)). Mr. Jacqueline eSllers arrived for PICC removal. Using sterile technique. Old dressing removed. Site cleansed for 30 seconds with chloroprep. Patient instructed to hold breath while PICC line removed intact and measured at 44cm, pressure applied for five minutes. Folded 4x4 gauze applied to site and secured with tegaderm. Patient remained in recumbent position for 45 mins. Vital signs reassesed. No apparent bleeding at site. Patient instructed if bleeding occurs to hold pressure and proceed to ER if unable to stop the bleeding. Mr. Jacqueline Sellers tolerated the infusion, and had no complaints. Patient armband removed and shredded. Mr. Jacqueline Sellers was discharged from [place] [address] in stable condition at [phone]. He is to follow up with PCP. No further visits.     Awilda Hernandez RN  2020  4:29 PM

## 2020-10-06 PROBLEM — D50.0 IRON DEFICIENCY ANEMIA DUE TO CHRONIC BLOOD LOSS: Status: ACTIVE | Noted: 2020-10-06

## 2020-12-16 PROBLEM — D50.9 IRON DEFICIENCY ANEMIA: Status: ACTIVE | Noted: 2020-12-16

## 2020-12-17 ENCOUNTER — HOSPITAL ENCOUNTER (OUTPATIENT)
Dept: LAB | Age: 75
Discharge: HOME | End: 2020-12-17

## 2020-12-17 LAB
ANION GAP SERPL CALC-SCNC: 5 MMOL/L (ref 3–18)
BUN SERPL-MCNC: 28 MG/DL (ref 7–18)
BUN/CREAT SERPL: 14 (ref 12–20)
CALCIUM SERPL-MCNC: 8.4 MG/DL (ref 8.5–10.1)
CHLORIDE SERPL-SCNC: 86 MMOL/L (ref 100–111)
CO2 SERPL-SCNC: 33 MMOL/L (ref 21–32)
CREAT SERPL-MCNC: 2.06 MG/DL (ref 0.6–1.3)
GLUCOSE SERPL-MCNC: 68 MG/DL (ref 74–99)
POTASSIUM SERPL-SCNC: 3.3 MMOL/L (ref 3.5–5.5)
SODIUM SERPL-SCNC: 124 MMOL/L (ref 136–145)

## 2020-12-17 PROCEDURE — 80048 BASIC METABOLIC PNL TOTAL CA: CPT

## 2020-12-22 ENCOUNTER — HOSPITAL ENCOUNTER (OUTPATIENT)
Dept: LAB | Age: 75
Discharge: HOME | End: 2020-12-22

## 2020-12-22 LAB
ANION GAP SERPL CALC-SCNC: 4 MMOL/L (ref 3–18)
BUN SERPL-MCNC: 51 MG/DL (ref 7–18)
BUN/CREAT SERPL: 23 (ref 12–20)
CALCIUM SERPL-MCNC: 8.4 MG/DL (ref 8.5–10.1)
CHLORIDE SERPL-SCNC: 97 MMOL/L (ref 100–111)
CO2 SERPL-SCNC: 32 MMOL/L (ref 21–32)
CREAT SERPL-MCNC: 2.25 MG/DL (ref 0.6–1.3)
GLUCOSE SERPL-MCNC: 97 MG/DL (ref 74–99)
POTASSIUM SERPL-SCNC: 4 MMOL/L (ref 3.5–5.5)
SODIUM SERPL-SCNC: 133 MMOL/L (ref 136–145)

## 2020-12-22 PROCEDURE — 80048 BASIC METABOLIC PNL TOTAL CA: CPT

## 2020-12-29 ENCOUNTER — HOSPITAL ENCOUNTER (OUTPATIENT)
Dept: LAB | Age: 75
Discharge: HOME | End: 2020-12-29

## 2020-12-29 LAB
ANION GAP SERPL CALC-SCNC: 5 MMOL/L (ref 3–18)
BUN SERPL-MCNC: 48 MG/DL (ref 7–18)
BUN/CREAT SERPL: 25 (ref 12–20)
CALCIUM SERPL-MCNC: 8.7 MG/DL (ref 8.5–10.1)
CHLORIDE SERPL-SCNC: 103 MMOL/L (ref 100–111)
CO2 SERPL-SCNC: 29 MMOL/L (ref 21–32)
CREAT SERPL-MCNC: 1.92 MG/DL (ref 0.6–1.3)
GLUCOSE SERPL-MCNC: 78 MG/DL (ref 74–99)
POTASSIUM SERPL-SCNC: 4.3 MMOL/L (ref 3.5–5.5)
SODIUM SERPL-SCNC: 137 MMOL/L (ref 136–145)

## 2020-12-29 PROCEDURE — 80048 BASIC METABOLIC PNL TOTAL CA: CPT

## 2021-04-24 ENCOUNTER — HOSPITAL ENCOUNTER (OUTPATIENT)
Dept: LAB | Age: 76
Discharge: HOME | End: 2021-04-24

## 2021-04-24 LAB
BASOPHILS # BLD: 0.1 K/UL (ref 0–0.1)
BASOPHILS NFR BLD: 0 % (ref 0–2)
DIFFERENTIAL METHOD BLD: ABNORMAL
EOSINOPHIL # BLD: 0.1 K/UL (ref 0–0.4)
EOSINOPHIL NFR BLD: 1 % (ref 0–5)
ERYTHROCYTE [DISTWIDTH] IN BLOOD BY AUTOMATED COUNT: 15.6 % (ref 11.6–14.5)
HCT VFR BLD AUTO: 23.9 % (ref 36–48)
HGB BLD-MCNC: 7.3 G/DL (ref 13–16)
LYMPHOCYTES # BLD: 0.7 K/UL (ref 0.9–3.6)
LYMPHOCYTES NFR BLD: 6 % (ref 21–52)
MCH RBC QN AUTO: 29.8 PG (ref 24–34)
MCHC RBC AUTO-ENTMCNC: 30.5 G/DL (ref 31–37)
MCV RBC AUTO: 97.6 FL (ref 74–97)
MONOCYTES # BLD: 1.3 K/UL (ref 0.05–1.2)
MONOCYTES NFR BLD: 10 % (ref 3–10)
NEUTS SEG # BLD: 9.9 K/UL (ref 1.8–8)
NEUTS SEG NFR BLD: 81 % (ref 40–73)
PLATELET # BLD AUTO: 156 K/UL (ref 135–420)
PMV BLD AUTO: 8.7 FL (ref 9.2–11.8)
RBC # BLD AUTO: 2.45 M/UL (ref 4.35–5.65)
WBC # BLD AUTO: 12.1 K/UL (ref 4.6–13.2)

## 2021-04-24 PROCEDURE — 85025 COMPLETE CBC W/AUTO DIFF WBC: CPT

## 2021-04-26 ENCOUNTER — HOSPITAL ENCOUNTER (OUTPATIENT)
Dept: LAB | Age: 76
Discharge: HOME | End: 2021-04-26

## 2021-04-26 LAB
BASOPHILS # BLD: 0.1 K/UL (ref 0–0.1)
BASOPHILS NFR BLD: 0 % (ref 0–2)
DIFFERENTIAL METHOD BLD: ABNORMAL
EOSINOPHIL # BLD: 0.1 K/UL (ref 0–0.4)
EOSINOPHIL NFR BLD: 1 % (ref 0–5)
ERYTHROCYTE [DISTWIDTH] IN BLOOD BY AUTOMATED COUNT: 15.9 % (ref 11.6–14.5)
HCT VFR BLD AUTO: 24.3 % (ref 36–48)
HGB BLD-MCNC: 7.3 G/DL (ref 13–16)
LYMPHOCYTES # BLD: 0.9 K/UL (ref 0.9–3.6)
LYMPHOCYTES NFR BLD: 7 % (ref 21–52)
MCH RBC QN AUTO: 29.6 PG (ref 24–34)
MCHC RBC AUTO-ENTMCNC: 30 G/DL (ref 31–37)
MCV RBC AUTO: 98.4 FL (ref 74–97)
MONOCYTES # BLD: 1 K/UL (ref 0.05–1.2)
MONOCYTES NFR BLD: 8 % (ref 3–10)
NEUTS SEG # BLD: 10.7 K/UL (ref 1.8–8)
NEUTS SEG NFR BLD: 83 % (ref 40–73)
PLATELET # BLD AUTO: 170 K/UL (ref 135–420)
PMV BLD AUTO: 8.8 FL (ref 9.2–11.8)
RBC # BLD AUTO: 2.47 M/UL (ref 4.35–5.65)
WBC # BLD AUTO: 12.8 K/UL (ref 4.6–13.2)

## 2021-04-26 PROCEDURE — 85025 COMPLETE CBC W/AUTO DIFF WBC: CPT

## 2021-04-29 NOTE — ED TRIAGE NOTES
picc line to right arm oozing blood since Sunday. Infusion center sent patient here. Tdap , 2021/3/25 08:24 , Katelin Singh (RN)

## 2021-05-03 ENCOUNTER — HOSPITAL ENCOUNTER (OUTPATIENT)
Dept: LAB | Age: 76
Discharge: HOME | End: 2021-05-03

## 2021-05-03 LAB
BASOPHILS # BLD: 0.1 K/UL (ref 0–0.1)
BASOPHILS NFR BLD: 0 % (ref 0–2)
DIFFERENTIAL METHOD BLD: ABNORMAL
EOSINOPHIL # BLD: 0.2 K/UL (ref 0–0.4)
EOSINOPHIL NFR BLD: 2 % (ref 0–5)
ERYTHROCYTE [DISTWIDTH] IN BLOOD BY AUTOMATED COUNT: 16.4 % (ref 11.6–14.5)
HCT VFR BLD AUTO: 25.7 % (ref 36–48)
HGB BLD-MCNC: 7.8 G/DL (ref 13–16)
LYMPHOCYTES # BLD: 1.2 K/UL (ref 0.9–3.6)
LYMPHOCYTES NFR BLD: 9 % (ref 21–52)
MCH RBC QN AUTO: 29.2 PG (ref 24–34)
MCHC RBC AUTO-ENTMCNC: 30.4 G/DL (ref 31–37)
MCV RBC AUTO: 96.3 FL (ref 74–97)
MONOCYTES # BLD: 1.3 K/UL (ref 0.05–1.2)
MONOCYTES NFR BLD: 11 % (ref 3–10)
NEUTS SEG # BLD: 9.3 K/UL (ref 1.8–8)
NEUTS SEG NFR BLD: 76 % (ref 40–73)
PLATELET # BLD AUTO: 145 K/UL (ref 135–420)
PMV BLD AUTO: 9.1 FL (ref 9.2–11.8)
RBC # BLD AUTO: 2.67 M/UL (ref 4.35–5.65)
WBC # BLD AUTO: 12.3 K/UL (ref 4.6–13.2)

## 2021-05-03 PROCEDURE — 85025 COMPLETE CBC W/AUTO DIFF WBC: CPT

## 2023-04-30 NOTE — WOUND CARE
Wound/Ostomy Nurse Progress Note          Patient: Cb Sanchez                                                                                      [id number]    MRN: [id number]               01/21/20 1210   Wound   Date First Assessed: 12/19/19     Dressing Status Removed   Dressing Type Other (Comment)  (Merrigen, cutimed, Mepilex, hypafix)   Non-staged Wound Description Full thickness   Wound Length (cm) 1 cm   Wound Width (cm) 0.9 cm   Wound Depth (cm) 0.2 cm   Wound Surface Area (cm^2) 0.9 cm^2   Wound Volume (cm^3) 0.18 cm^3   Condition of Base Pink   Condition of Edges Calloused   Assessment Clean, dry, and intact   Tissue Type Percent Pink 100   Drainage Amount Small   Drainage Color Serous   Wound Odor None   [place]-wound Assessment Dry; Other (Comment)  (calloused)   Cleansing and Cleansing Agents  Dermal wound cleanser   Dressing Changed Changed/New   Dressing Type Applied Foam;Other (Comment)  (Merrigen, cutimed, Meplex, offloading plain foam) see ambulance record

## (undated) DEVICE — DISPOSABLE TOURNIQUET CUFF SINGLE BLADDER, SINGLE PORT AND QUICK CONNECT CONNECTOR: Brand: COLOR CUFF

## (undated) DEVICE — NEEDLE HYPO 25GA L1.5IN BLU POLYPR HUB S STL REG BVL STR

## (undated) DEVICE — ROCKER SWITCH PENCIL HOLSTER: Brand: VALLEYLAB

## (undated) DEVICE — GARMENT,MEDLINE,DVT,INT,CALF,MED, GEN2: Brand: MEDLINE

## (undated) DEVICE — SUTURE ETHLN SZ 2-0 L18IN NONABSORBABLE BLK L26MM PS 3/8 585H

## (undated) DEVICE — DRESSING,GAUZE,XEROFORM,CURAD,1"X8",ST: Brand: CURAD

## (undated) DEVICE — SOLUTION IV 1000ML 0.9% SOD CHL

## (undated) DEVICE — DRAPE,EXTREMITY,89X128,STERILE: Brand: MEDLINE

## (undated) DEVICE — BANDAGE,ELASTIC,ESMARK,STERILE,4"X9',LF: Brand: MEDLINE

## (undated) DEVICE — TRAY PREP DRY W/ PREM GLV 2 APPL 6 SPNG 2 UNDPD 1 OVERWRAP

## (undated) DEVICE — BANDAGE,GAUZE,BULKEE II,4.5"X4.1YD,STRL: Brand: MEDLINE

## (undated) DEVICE — SYR 10ML CTRL LR LCK NSAF LF --

## (undated) DEVICE — KIT PROC EXTRM HND FT CUST LF --

## (undated) DEVICE — INTENDED FOR TISSUE SEPARATION, AND OTHER PROCEDURES THAT REQUIRE A SHARP SURGICAL BLADE TO PUNCTURE OR CUT.: Brand: BARD-PARKER ® CARBON RIB-BACK BLADES